# Patient Record
Sex: FEMALE | Race: WHITE | Employment: FULL TIME | URBAN - METROPOLITAN AREA
[De-identification: names, ages, dates, MRNs, and addresses within clinical notes are randomized per-mention and may not be internally consistent; named-entity substitution may affect disease eponyms.]

---

## 2018-11-08 ENCOUNTER — HOSPITAL ENCOUNTER (INPATIENT)
Age: 23
LOS: 4 days | Discharge: HOME | DRG: 639 | End: 2018-11-12
Attending: EMERGENCY MEDICINE | Admitting: INTERNAL MEDICINE
Payer: COMMERCIAL

## 2018-11-08 ENCOUNTER — APPOINTMENT (OUTPATIENT)
Dept: GENERAL RADIOLOGY | Age: 23
DRG: 639 | End: 2018-11-08
Attending: INTERNAL MEDICINE
Payer: COMMERCIAL

## 2018-11-08 DIAGNOSIS — E13.10 DIABETIC KETOACIDOSIS WITHOUT COMA ASSOCIATED WITH OTHER SPECIFIED DIABETES MELLITUS (HCC): Primary | ICD-10-CM

## 2018-11-08 PROBLEM — E11.10 DKA (DIABETIC KETOACIDOSES): Status: ACTIVE | Noted: 2018-11-08

## 2018-11-08 PROBLEM — N61.1 ABSCESS OF RIGHT BREAST: Status: ACTIVE | Noted: 2018-11-08

## 2018-11-08 LAB
ADMINISTERED INITIALS, ADMINIT: NORMAL
ALBUMIN SERPL-MCNC: 4.1 G/DL (ref 3.5–5)
ALBUMIN/GLOB SERPL: 1.1 {RATIO} (ref 1.2–3.5)
ALP SERPL-CCNC: 166 U/L (ref 50–136)
ALT SERPL-CCNC: 27 U/L (ref 12–65)
ANION GAP SERPL CALC-SCNC: 18 MMOL/L (ref 7–16)
ANION GAP SERPL CALC-SCNC: 21 MMOL/L (ref 7–16)
ANION GAP SERPL CALC-SCNC: 23 MMOL/L (ref 7–16)
AST SERPL-CCNC: 17 U/L (ref 15–37)
ATRIAL RATE: 138 BPM
BASOPHILS # BLD: 0 K/UL (ref 0–0.2)
BASOPHILS NFR BLD: 0 % (ref 0–2)
BILIRUB SERPL-MCNC: 0.4 MG/DL (ref 0.2–1.1)
BUN SERPL-MCNC: 12 MG/DL (ref 6–23)
BUN SERPL-MCNC: 6 MG/DL (ref 6–23)
BUN SERPL-MCNC: 9 MG/DL (ref 6–23)
CALCIUM SERPL-MCNC: 7 MG/DL (ref 8.3–10.4)
CALCIUM SERPL-MCNC: 7.5 MG/DL (ref 8.3–10.4)
CALCIUM SERPL-MCNC: 8.1 MG/DL (ref 8.3–10.4)
CALCULATED P AXIS, ECG09: 77 DEGREES
CALCULATED R AXIS, ECG10: 73 DEGREES
CALCULATED T AXIS, ECG11: 74 DEGREES
CHLORIDE SERPL-SCNC: 110 MMOL/L (ref 98–107)
CHLORIDE SERPL-SCNC: 113 MMOL/L (ref 98–107)
CHLORIDE SERPL-SCNC: 119 MMOL/L (ref 98–107)
CO2 SERPL-SCNC: 4 MMOL/L (ref 21–32)
CO2 SERPL-SCNC: 5 MMOL/L (ref 21–32)
CO2 SERPL-SCNC: 8 MMOL/L (ref 21–32)
CREAT SERPL-MCNC: 0.6 MG/DL (ref 0.6–1)
CREAT SERPL-MCNC: 0.62 MG/DL (ref 0.6–1)
CREAT SERPL-MCNC: 0.95 MG/DL (ref 0.6–1)
D50 ADMINISTERED, D50ADM: 0 ML
D50 ORDER, D50ORD: 0 ML
DIAGNOSIS, 93000: NORMAL
DIFFERENTIAL METHOD BLD: ABNORMAL
EOSINOPHIL # BLD: 0 K/UL (ref 0–0.8)
EOSINOPHIL NFR BLD: 0 % (ref 0.5–7.8)
ERYTHROCYTE [DISTWIDTH] IN BLOOD BY AUTOMATED COUNT: 13.2 %
EST. AVERAGE GLUCOSE BLD GHB EST-MCNC: 240 MG/DL
GLOBULIN SER CALC-MCNC: 3.9 G/DL (ref 2.3–3.5)
GLSCOM COMMENTS: NORMAL
GLUCOSE BLD STRIP.AUTO-MCNC: 124 MG/DL (ref 65–100)
GLUCOSE BLD STRIP.AUTO-MCNC: 128 MG/DL (ref 65–100)
GLUCOSE BLD STRIP.AUTO-MCNC: 159 MG/DL (ref 65–100)
GLUCOSE BLD STRIP.AUTO-MCNC: 192 MG/DL (ref 65–100)
GLUCOSE BLD STRIP.AUTO-MCNC: 258 MG/DL (ref 65–100)
GLUCOSE BLD STRIP.AUTO-MCNC: 345 MG/DL (ref 65–100)
GLUCOSE BLD STRIP.AUTO-MCNC: 413 MG/DL (ref 65–100)
GLUCOSE SERPL-MCNC: 111 MG/DL (ref 65–100)
GLUCOSE SERPL-MCNC: 185 MG/DL (ref 65–100)
GLUCOSE SERPL-MCNC: 425 MG/DL (ref 65–100)
GLUCOSE, GLC: 124 MG/DL
GLUCOSE, GLC: 128 MG/DL
GLUCOSE, GLC: 159 MG/DL
GLUCOSE, GLC: 192 MG/DL
GLUCOSE, GLC: 258 MG/DL
GLUCOSE, GLC: 345 MG/DL
HBA1C MFR BLD: 10 % (ref 4.8–6)
HCG UR QL: NEGATIVE
HCG UR QL: NEGATIVE
HCT VFR BLD AUTO: 44.3 % (ref 35.8–46.3)
HGB BLD-MCNC: 14.9 G/DL (ref 11.7–15.4)
HIGH TARGET, HITG: 250 MG/DL
IMM GRANULOCYTES # BLD: 0.1 K/UL (ref 0–0.5)
IMM GRANULOCYTES NFR BLD AUTO: 1 % (ref 0–5)
INSULIN ADMINSTERED, INSADM: 0 UNITS/HOUR
INSULIN ADMINSTERED, INSADM: 0.7 UNITS/HOUR
INSULIN ADMINSTERED, INSADM: 2 UNITS/HOUR
INSULIN ADMINSTERED, INSADM: 2.6 UNITS/HOUR
INSULIN ADMINSTERED, INSADM: 4 UNITS/HOUR
INSULIN ADMINSTERED, INSADM: 5.7 UNITS/HOUR
INSULIN ORDER, INSORD: 0 UNITS/HOUR
INSULIN ORDER, INSORD: 0.7 UNITS/HOUR
INSULIN ORDER, INSORD: 2 UNITS/HOUR
INSULIN ORDER, INSORD: 2.6 UNITS/HOUR
INSULIN ORDER, INSORD: 4 UNITS/HOUR
INSULIN ORDER, INSORD: 5.7 UNITS/HOUR
LOW TARGET, LOT: 150 MG/DL
LYMPHOCYTES # BLD: 1 K/UL (ref 0.5–4.6)
LYMPHOCYTES NFR BLD: 8 % (ref 13–44)
MAGNESIUM SERPL-MCNC: 1.5 MG/DL (ref 1.8–2.4)
MAGNESIUM SERPL-MCNC: 1.6 MG/DL (ref 1.8–2.4)
MAGNESIUM SERPL-MCNC: 2 MG/DL (ref 1.8–2.4)
MCH RBC QN AUTO: 34.5 PG (ref 26.1–32.9)
MCHC RBC AUTO-ENTMCNC: 33.6 G/DL (ref 31.4–35)
MCV RBC AUTO: 102.5 FL (ref 79.6–97.8)
MINUTES UNTIL NEXT BG, NBG: 60 MIN
MONOCYTES # BLD: 0.8 K/UL (ref 0.1–1.3)
MONOCYTES NFR BLD: 6 % (ref 4–12)
MULTIPLIER, MUL: 0
MULTIPLIER, MUL: 0.01
MULTIPLIER, MUL: 0.02
NEUTS SEG # BLD: 11 K/UL (ref 1.7–8.2)
NEUTS SEG NFR BLD: 85 % (ref 43–78)
NRBC # BLD: 0 K/UL (ref 0–0.2)
ORDER INITIALS, ORDINIT: NORMAL
P-R INTERVAL, ECG05: 186 MS
PHOSPHATE SERPL-MCNC: 1.4 MG/DL (ref 2.5–4.5)
PHOSPHATE SERPL-MCNC: 4.3 MG/DL (ref 2.5–4.5)
PLATELET # BLD AUTO: 165 K/UL (ref 150–450)
PMV BLD AUTO: 11.8 FL (ref 9.4–12.3)
POTASSIUM SERPL-SCNC: 4.3 MMOL/L (ref 3.5–5.1)
POTASSIUM SERPL-SCNC: 4.3 MMOL/L (ref 3.5–5.1)
POTASSIUM SERPL-SCNC: 5 MMOL/L (ref 3.5–5.1)
PROT SERPL-MCNC: 8 G/DL (ref 6.3–8.2)
Q-T INTERVAL, ECG07: 280 MS
QRS DURATION, ECG06: 74 MS
QTC CALCULATION (BEZET), ECG08: 424 MS
RBC # BLD AUTO: 4.32 M/UL (ref 4.05–5.2)
SODIUM SERPL-SCNC: 138 MMOL/L (ref 136–145)
SODIUM SERPL-SCNC: 139 MMOL/L (ref 136–145)
SODIUM SERPL-SCNC: 144 MMOL/L (ref 136–145)
VENTRICULAR RATE, ECG03: 138 BPM
WBC # BLD AUTO: 12.9 K/UL (ref 4.3–11.1)

## 2018-11-08 PROCEDURE — 77030020263 HC SOL INJ SOD CL0.9% LFCR 1000ML

## 2018-11-08 PROCEDURE — 74011250637 HC RX REV CODE- 250/637: Performed by: INTERNAL MEDICINE

## 2018-11-08 PROCEDURE — 81025 URINE PREGNANCY TEST: CPT

## 2018-11-08 PROCEDURE — 84100 ASSAY OF PHOSPHORUS: CPT

## 2018-11-08 PROCEDURE — 96361 HYDRATE IV INFUSION ADD-ON: CPT | Performed by: EMERGENCY MEDICINE

## 2018-11-08 PROCEDURE — 74011000250 HC RX REV CODE- 250: Performed by: INTERNAL MEDICINE

## 2018-11-08 PROCEDURE — 80053 COMPREHEN METABOLIC PANEL: CPT

## 2018-11-08 PROCEDURE — 81003 URINALYSIS AUTO W/O SCOPE: CPT | Performed by: EMERGENCY MEDICINE

## 2018-11-08 PROCEDURE — 71045 X-RAY EXAM CHEST 1 VIEW: CPT

## 2018-11-08 PROCEDURE — 83735 ASSAY OF MAGNESIUM: CPT

## 2018-11-08 PROCEDURE — 74011250636 HC RX REV CODE- 250/636: Performed by: EMERGENCY MEDICINE

## 2018-11-08 PROCEDURE — 74011250636 HC RX REV CODE- 250/636: Performed by: HOSPITALIST

## 2018-11-08 PROCEDURE — 74011636637 HC RX REV CODE- 636/637: Performed by: EMERGENCY MEDICINE

## 2018-11-08 PROCEDURE — 99285 EMERGENCY DEPT VISIT HI MDM: CPT | Performed by: EMERGENCY MEDICINE

## 2018-11-08 PROCEDURE — 82962 GLUCOSE BLOOD TEST: CPT

## 2018-11-08 PROCEDURE — 74011000258 HC RX REV CODE- 258: Performed by: EMERGENCY MEDICINE

## 2018-11-08 PROCEDURE — 74011250636 HC RX REV CODE- 250/636: Performed by: INTERNAL MEDICINE

## 2018-11-08 PROCEDURE — 82800 BLOOD PH: CPT

## 2018-11-08 PROCEDURE — 83036 HEMOGLOBIN GLYCOSYLATED A1C: CPT

## 2018-11-08 PROCEDURE — 96374 THER/PROPH/DIAG INJ IV PUSH: CPT | Performed by: EMERGENCY MEDICINE

## 2018-11-08 PROCEDURE — 85025 COMPLETE CBC W/AUTO DIFF WBC: CPT

## 2018-11-08 PROCEDURE — 82803 BLOOD GASES ANY COMBINATION: CPT

## 2018-11-08 PROCEDURE — 65610000001 HC ROOM ICU GENERAL

## 2018-11-08 PROCEDURE — 93005 ELECTROCARDIOGRAM TRACING: CPT | Performed by: EMERGENCY MEDICINE

## 2018-11-08 PROCEDURE — 80048 BASIC METABOLIC PNL TOTAL CA: CPT

## 2018-11-08 PROCEDURE — 77030019605

## 2018-11-08 PROCEDURE — 36415 COLL VENOUS BLD VENIPUNCTURE: CPT

## 2018-11-08 RX ORDER — DEXTROSE 40 %
15 GEL (GRAM) ORAL AS NEEDED
Status: DISCONTINUED | OUTPATIENT
Start: 2018-11-08 | End: 2018-11-12 | Stop reason: HOSPADM

## 2018-11-08 RX ORDER — SODIUM CHLORIDE 9 MG/ML
150 INJECTION, SOLUTION INTRAVENOUS CONTINUOUS
Status: DISCONTINUED | OUTPATIENT
Start: 2018-11-08 | End: 2018-11-09

## 2018-11-08 RX ORDER — ONDANSETRON 2 MG/ML
4 INJECTION INTRAMUSCULAR; INTRAVENOUS
Status: DISCONTINUED | OUTPATIENT
Start: 2018-11-08 | End: 2018-11-12 | Stop reason: HOSPADM

## 2018-11-08 RX ORDER — HEPARIN SODIUM 5000 [USP'U]/ML
5000 INJECTION, SOLUTION INTRAVENOUS; SUBCUTANEOUS EVERY 12 HOURS
Status: DISCONTINUED | OUTPATIENT
Start: 2018-11-08 | End: 2018-11-10

## 2018-11-08 RX ORDER — DEXTROSE 50 % IN WATER (D50W) INTRAVENOUS SYRINGE
25-50 AS NEEDED
Status: DISCONTINUED | OUTPATIENT
Start: 2018-11-08 | End: 2018-11-09

## 2018-11-08 RX ORDER — SODIUM CHLORIDE 9 MG/ML
2000 INJECTION, SOLUTION INTRAVENOUS ONCE
Status: COMPLETED | OUTPATIENT
Start: 2018-11-08 | End: 2018-11-09

## 2018-11-08 RX ORDER — CEPHALEXIN 500 MG/1
500 CAPSULE ORAL EVERY 6 HOURS
Status: DISCONTINUED | OUTPATIENT
Start: 2018-11-08 | End: 2018-11-12 | Stop reason: HOSPADM

## 2018-11-08 RX ORDER — POTASSIUM CHLORIDE AND SODIUM CHLORIDE 450; 150 MG/100ML; MG/100ML
INJECTION, SOLUTION INTRAVENOUS CONTINUOUS
Status: DISCONTINUED | OUTPATIENT
Start: 2018-11-08 | End: 2018-11-08

## 2018-11-08 RX ADMIN — SODIUM CHLORIDE 5.7 UNITS/HR: 9 INJECTION, SOLUTION INTRAVENOUS at 18:16

## 2018-11-08 RX ADMIN — SODIUM CHLORIDE 125 ML/HR: 900 INJECTION, SOLUTION INTRAVENOUS at 21:05

## 2018-11-08 RX ADMIN — SODIUM CHLORIDE 1000 ML: 900 INJECTION, SOLUTION INTRAVENOUS at 18:17

## 2018-11-08 RX ADMIN — Medication: at 20:01

## 2018-11-08 RX ADMIN — INSULIN HUMAN 10 UNITS: 100 INJECTION, SOLUTION PARENTERAL at 17:18

## 2018-11-08 RX ADMIN — SODIUM CHLORIDE 2000 ML: 900 INJECTION, SOLUTION INTRAVENOUS at 16:47

## 2018-11-08 RX ADMIN — HEPARIN SODIUM 5000 UNITS: 5000 INJECTION INTRAVENOUS; SUBCUTANEOUS at 19:41

## 2018-11-08 RX ADMIN — CEPHALEXIN 500 MG: 500 CAPSULE ORAL at 19:41

## 2018-11-08 NOTE — ED NOTES
TRANSFER - OUT REPORT: 
 
Verbal report given to Hany Horan RN(name) on [employer]  being transferred to 3103(unit) for routine progression of care Report consisted of patients Situation, Background, Assessment and  
Recommendations(SBAR). Information from the following report(s) SBAR, Kardex, ED Summary, [place] and Recent Results was reviewed with the receiving nurse. Lines:  
Peripheral IV 11/08/18 Right Antecubital (Active) Site Assessment Clean, dry, & intact 11/8/2018  4:26 PM  
Phlebitis Assessment 0 11/8/2018  4:26 PM  
Infiltration Assessment 0 11/8/2018  4:26 PM  
Dressing Status Clean, dry, & intact 11/8/2018  4:26 PM  
Hub Color/Line Status Pink 11/8/2018  4:26 PM  
   
Peripheral IV 11/08/18 Left Hand (Active) Site Assessment Clean, dry, & intact 11/8/2018  5:44 PM  
Phlebitis Assessment 0 11/8/2018  5:44 PM  
Infiltration Assessment 0 11/8/2018  5:44 PM  
Dressing Status Clean, dry, & intact 11/8/2018  5:44 PM  
Dressing Type Transparent 11/8/2018  5:44 PM  
  
 
Opportunity for questions and clarification was provided. Patient transported with: 
 Registered Nurse

## 2018-11-08 NOTE — H&P
Hospitalist H&P/Consult Note Admit Date:  2018  4:19 PM  
Name:  Dwaine Vance Age:  20 y.o. 
:  1995 MRN:  [id number] PCP:  Jessica Alvarenga MD 
Treatment Team: Primary Nurse: David Wilson RN 
 
HPI:  
20years old F with PMH of anxiety presented to the hospital complaining of nausea and vomiting started this morning. Patient stated having associated epigastric pain worse with vomiting. Patient reported feeling very thirsty and having polyuria for several weeks. Patient denies history of DM or family history of DM. Patient went to urgent care where she was found to have elevated blood sugar above 400. Patient reported had I&D of R breast abscess twice, she is on day 1 of keflex. Patient also denies cough, sick contacts at home, diarrhea, dysuria. In the ED labs revealed blood glucose above 400 with AG 23. Respiratory therapist reported a Vanous Ph of 6.88   
 
10 systems reviewed and negative except as noted in HPI. History reviewed. No pertinent past medical history. Past Surgical History:  
Procedure Laterality Date  HX LYMPHADENECTOMY Left  Prior to Admission Medications Prescriptions Last Dose Informant Patient Reported? Taking? LORazepam (ATIVAN) 0.5 mg tablet   No No  
Sig: Take 1 Tab by mouth every eight (8) hours as needed for Anxiety. Max Daily Amount: 1.5 mg.  
escitalopram oxalate (LEXAPRO) 20 mg tablet   No No  
Sig: Take 1 Tab by mouth daily. Facility-Administered Medications: None No Known Allergies Social History Tobacco Use  Smoking status: Never Smoker  Smokeless tobacco: Never Used Substance Use Topics  Alcohol use: Yes Comment: wine occasionally Family History Problem Relation Age of Onset  No Known Problems Mother  No Known Problems Father There is no immunization history on file for this patient. Objective:  
 
Patient Vitals for the past 24 hrs: 
 Temp Pulse BP SpO2 11/08/18 1843   116/56   
11/08/18 1721 97.8 °F (36.6 °C)     
11/08/18 1620  (!) 144 130/80 100 % Oxygen Therapy O2 Sat (%): 100 % (11/08/18 1620) Pulse via Oximetry: 144 beats per minute (11/08/18 1620) No intake or output data in the 24 hours ending 11/08/18 1855 Physical Exam: 
General:    Well nourished. Alert. Eyes:   Normal sclera. Extraocular movements intact. ENT:  Normocephalic, atraumatic. Moist mucous membranes CV:   RRR. No murmur, rub, or gallop. Lungs:  CTAB. No wheezing, rhonchi, or rales. Abdomen: Soft, nontender, nondistended. Bowel sounds normal.  
Extremities: Warm and dry. No cyanosis or edema. Neurologic: CN II-XII grossly intact. Sensation intact. Skin:     No rashes or jaundice. No wounds. Psych:  Normal mood and affect. Breast exam:   R breast with no tenderness, erythema or fluctuance. I reviewed the labs and other studies done this admission. Data Review:  
Recent Results (from the past 24 hour(s)) GLUCOSE, POC Collection Time: 11/08/18  4:27 PM  
Result Value Ref Range Glucose (POC) 413 (H) 65 - 100 mg/dL METABOLIC PANEL, COMPREHENSIVE Collection Time: 11/08/18  4:36 PM  
Result Value Ref Range Sodium 138 136 - 145 mmol/L Potassium 5.0 3.5 - 5.1 mmol/L Chloride 110 (H) 98 - 107 mmol/L  
 CO2 5 (LL) 21 - 32 mmol/L Anion gap 23 (H) 7 - 16 mmol/L Glucose 425 (H) 65 - 100 mg/dL BUN 12 6 - 23 MG/DL Creatinine 0.95 0.6 - 1.0 MG/DL  
 GFR est AA >60 >60 ml/min/1.73m2 GFR est non-AA >60 >60 ml/min/1.73m2 Calcium 8.1 (L) 8.3 - 10.4 MG/DL Bilirubin, total 0.4 0.2 - 1.1 MG/DL  
 ALT (SGPT) 27 12 - 65 U/L  
 AST (SGOT) 17 15 - 37 U/L Alk. phosphatase 166 (H) 50 - 136 U/L Protein, total 8.0 6.3 - 8.2 g/dL Albumin 4.1 3.5 - 5.0 g/dL Globulin 3.9 (H) 2.3 - 3.5 g/dL A-G Ratio 1.1 (L) 1.2 - 3.5 MAGNESIUM Collection Time: 11/08/18  4:36 PM  
Result Value Ref Range Magnesium 2.0 1.8 - 2.4 mg/dL PHOSPHORUS Collection Time: 11/08/18  4:36 PM  
Result Value Ref Range Phosphorus 4.3 2.5 - 4.5 MG/DL  
HCG URINE, QL. - POC Collection Time: 11/08/18  4:39 PM  
Result Value Ref Range Pregnancy test,urine (POC) NEGATIVE  NEG    
CBC WITH AUTOMATED DIFF Collection Time: 11/08/18  5:04 PM  
Result Value Ref Range WBC 12.9 (H) 4.3 - 11.1 K/uL  
 RBC 4.32 4.05 - 5.2 M/uL  
 HGB 14.9 11.7 - 15.4 g/dL HCT 44.3 35.8 - 46.3 % .5 (H) 79.6 - 97.8 FL  
 MCH 34.5 (H) 26.1 - 32.9 PG  
 MCHC 33.6 31.4 - 35.0 g/dL  
 RDW 13.2 % PLATELET 829 581 - 953 K/uL MPV 11.8 9.4 - 12.3 FL ABSOLUTE NRBC 0.00 0.0 - 0.2 K/uL NEUTROPHILS 85 (H) 43 - 78 % LYMPHOCYTES 8 (L) 13 - 44 % MONOCYTES 6 4.0 - 12.0 % EOSINOPHILS 0 (L) 0.5 - 7.8 % BASOPHILS 0 0.0 - 2.0 % IMMATURE GRANULOCYTES 1 0.0 - 5.0 %  
 ABS. NEUTROPHILS 11.0 (H) 1.7 - 8.2 K/UL  
 ABS. LYMPHOCYTES 1.0 0.5 - 4.6 K/UL  
 ABS. MONOCYTES 0.8 0.1 - 1.3 K/UL  
 ABS. EOSINOPHILS 0.0 0.0 - 0.8 K/UL  
 ABS. BASOPHILS 0.0 0.0 - 0.2 K/UL  
 ABS. IMM. GRANS. 0.1 0.0 - 0.5 K/UL  
 DF AUTOMATED    
HCG URINE, QL. - POC Collection Time: 11/08/18  5:11 PM  
Result Value Ref Range Pregnancy test,urine (POC) NEGATIVE  NEG    
GLUCOSE, POC Collection Time: 11/08/18  6:10 PM  
Result Value Ref Range Glucose (POC) 345 (H) 65 - 100 mg/dL Jony Plume Collection Time: 11/08/18  6:12 PM  
Result Value Ref Range Glucose 345 mg/dL Insulin order 5.7 units/hour Insulin adminstered 5.7 units/hour Multiplier 0.020 Low target 150 mg/dL High target 250 mg/dL D50 order 0.0 ml  
 D50 administered 0.00 ml Minutes until next BG 60 min Order initials sw Administered initials sw GLSCOM Comments Imaging Studies: CXR Results  (Last 48 hours) None CT Results  (Last 48 hours) None Assessment and Plan:  
 
Hospital Problems as of 11/8/2018 Date Reviewed: 10/11/2018 Codes Class Noted - Resolved POA * (Principal) DKA (diabetic ketoacidoses) ([facility] [address] ICD-10-CM: E13.10 ICD-9-CM: 250.10  11/8/2018 - Present Unknown Abscess of right breast ICD-10-CM: N61.1 ICD-9-CM: 611.0  11/8/2018 - Present Unknown  
   
  
 
-DKA New onset diabetes Patient with elevated AG, elevated blood glucose and low venous ph 
Mild elevation WBC Plan Start DKA protocol with glucose stabilizer Patient ph is 6.88 requiring Bicar per ADA protocol Check electrolytes per protocol and continue insulin drip IVFs with 0.45 +K+. Bicar infusion for 2 hours and reassess venous pH Diabetes educator consult on AM 
Check CXR and UA for any occult infection 
 
 
-R Breast abscess S/p I&D at breast clinic 
R breast does not look infected on physical exam 
Cont keflex PO 
 
 
DVT ppx: heparin Code status:Full Case discussed with patient and family at bedside. Estimated LOS:   
 
Signed: Kin Henderson MD

## 2018-11-08 NOTE — ED PROVIDER NOTES
77-year-old female presents with complaint of generalized weakness for the past several days. Patient was seen in urgent care informed that she was hyperglycemic. States that for the past 1-2 weeks she has had intermittent polyuria and polydipsia. States today should worsening generalized weakness and fatigue. Patient reports that she had nausea and vomiting. Patient denies any significant past medical history. States around 1-2 weeks ago she was diagnosed with abscess  To right breast.  States she had site I&D and was on antibiotics. Denies any drainage from site currently. Patient denies abdominal pain, dysuria, hematuria, chest pain, shortness of breath, headache, neck pain. The history is provided by the patient. No  was used. High Blood Sugar This is a new problem. The current episode started 12 to 24 hours ago. The problem occurs constantly. The problem has not changed since onset. The pain is at a severity of 0/10. The patient is experiencing no pain. Associated symptoms include nausea, vomiting and myalgias. Pertinent negatives include no anorexia, no fever, no belching, no diarrhea, no flatus, no hematochezia, no melena, no constipation, no dysuria, no frequency, no hematuria, no headaches, no arthralgias, no trauma, no chest pain and no back pain. Nothing worsens the pain. The pain is relieved by nothing. History reviewed. No pertinent past medical history. Past Surgical History:  
Procedure Laterality Date  HX LYMPHADENECTOMY Left 2003 Family History:  
Problem Relation Age of Onset  No Known Problems Mother  No Known Problems Father Social History Socioeconomic History  Marital status:  Spouse name: Not on file  Number of children: Not on file  Years of education: Not on file  Highest education level: Not on file Social Needs  Financial resource strain: Not on file  Food insecurity - worry: Not on file  Food insecurity - inability: Not on file  Transportation needs - medical: Not on file  Transportation needs - non-medical: Not on file Occupational History  Not on file Tobacco Use  Smoking status: Never Smoker  Smokeless tobacco: Never Used Substance and Sexual Activity  Alcohol use: Yes Comment: wine occasionally  Drug use: Not on file  Sexual activity: Not on file Other Topics Concern  Not on file Social History Narrative  Not on file ALLERGIES: Patient has no known allergies. Review of Systems Constitutional: Negative for fever. Cardiovascular: Negative for chest pain. Gastrointestinal: Positive for nausea and vomiting. Negative for anorexia, constipation, diarrhea, flatus, hematochezia and melena. Genitourinary: Negative for dysuria, frequency and hematuria. Musculoskeletal: Positive for myalgias. Negative for arthralgias and back pain. Neurological: Negative for headaches. Vitals:  
 11/08/18 1620 BP: 130/80 Pulse: (!) 144 SpO2: 100% Physical Exam  
Constitutional: She is oriented to person, place, and time. She appears well-developed. Patient well-appearing and in no acute distress. HENT:  
Head: Normocephalic. Dry mucous membranes. Eyes: Conjunctivae and EOM are normal. Pupils are equal, round, and reactive to light. Neck: Neck supple. No JVD present. Cardiovascular: Normal rate, regular rhythm and normal heart sounds. Tachycardic. Radial pulses 2+ and equal bilaterally. Pulmonary/Chest: Effort normal.  
CTAB. Abdominal: Soft. Bowel sounds are normal. She exhibits no distension. There is no tenderness. Soft, NTND. No CVAT. No rebound or guarding. Musculoskeletal: Normal range of motion. She exhibits no edema. No LE edema or calf TTP. Neurological: She is alert and oriented to person, place, and time. No cranial nerve deficit or sensory deficit. Strength 5/5 throughout. Normal sensory exam.  No facial droop. No dysarthria. Normal gait. Skin: Skin is warm and dry. No rash. Psychiatric: She has a normal mood and affect. Nursing note and vitals reviewed. MDM Number of Diagnoses or Management Options Diabetic ketoacidosis without coma associated with other specified diabetes mellitus [facility]): new and requires workup Diagnosis management comments: Glucose >400. AG 23. CO2 5. ABG w/ pH 6.88. Urine ketones >160. Patient given 2 L normal saline IV fluid bolus. Patient given insulin 10 units IV. Insulin drip ordered. K 5.0. Hospitalist consulted for admission to ICU. Patient & family updated on findings. Amount and/or Complexity of Data Reviewed Clinical lab tests: ordered and reviewed Tests in the radiology section of CPT®: ordered and reviewed Tests in the medicine section of CPT®: reviewed and ordered Review and summarize past medical records: yes Independent visualization of images, tracings, or specimens: yes Risk of Complications, Morbidity, and/or Mortality Presenting problems: moderate Diagnostic procedures: moderate Management options: moderate Patient Progress Patient progress: stable ED Course as of Nov 08 1753 Thu Nov 08, 2018  
1725 UPT negative. Urine ketones >160. [DF] ED Course User Index 
[DF] Alycia Huang MD  
 
 
EKG Date/Time: 11/8/2018 6:05 PM 
Performed by: Alycia Huang MD 
Authorized by: Alycia Huang MD  
 
ECG reviewed by ED Physician in the absence of a cardiologist: yes Rate:  
  ECG rate:  134 ECG rate assessment: tachycardic Rhythm:  
  Rhythm: sinus tachycardia Ectopy:  
  Ectopy: none QRS:  
  QRS axis:  Normal 
  QRS intervals:  Normal 
Conduction:  
  Conduction: normal   
ST segments: ST segments:  Normal 
T waves:  
  T waves: normal   
 
 
 
Results Include: 
 
Recent Results (from the past 24 hour(s)) GLUCOSE, POC Collection Time: 11/08/18  4:27 PM  
Result Value Ref Range Glucose (POC) 413 (H) 65 - 100 mg/dL METABOLIC PANEL, COMPREHENSIVE Collection Time: 11/08/18  4:36 PM  
Result Value Ref Range Sodium 138 136 - 145 mmol/L Potassium 5.0 3.5 - 5.1 mmol/L Chloride 110 (H) 98 - 107 mmol/L  
 CO2 5 (LL) 21 - 32 mmol/L Anion gap 23 (H) 7 - 16 mmol/L Glucose 425 (H) 65 - 100 mg/dL BUN 12 6 - 23 MG/DL Creatinine 0.95 0.6 - 1.0 MG/DL  
 GFR est AA >60 >60 ml/min/1.73m2 GFR est non-AA >60 >60 ml/min/1.73m2 Calcium 8.1 (L) 8.3 - 10.4 MG/DL Bilirubin, total 0.4 0.2 - 1.1 MG/DL  
 ALT (SGPT) 27 12 - 65 U/L  
 AST (SGOT) 17 15 - 37 U/L Alk. phosphatase 166 (H) 50 - 136 U/L Protein, total 8.0 6.3 - 8.2 g/dL Albumin 4.1 3.5 - 5.0 g/dL Globulin 3.9 (H) 2.3 - 3.5 g/dL A-G Ratio 1.1 (L) 1.2 - 3.5 MAGNESIUM Collection Time: 11/08/18  4:36 PM  
Result Value Ref Range Magnesium 2.0 1.8 - 2.4 mg/dL PHOSPHORUS Collection Time: 11/08/18  4:36 PM  
Result Value Ref Range Phosphorus 4.3 2.5 - 4.5 MG/DL  
HCG URINE, QL. - POC Collection Time: 11/08/18  4:39 PM  
Result Value Ref Range Pregnancy test,urine (POC) NEGATIVE  NEG    
CBC WITH AUTOMATED DIFF Collection Time: 11/08/18  5:04 PM  
Result Value Ref Range WBC 12.9 (H) 4.3 - 11.1 K/uL  
 RBC 4.32 4.05 - 5.2 M/uL  
 HGB 14.9 11.7 - 15.4 g/dL HCT 44.3 35.8 - 46.3 % .5 (H) 79.6 - 97.8 FL  
 MCH 34.5 (H) 26.1 - 32.9 PG  
 MCHC 33.6 31.4 - 35.0 g/dL  
 RDW 13.2 % PLATELET 434 425 - 134 K/uL MPV 11.8 9.4 - 12.3 FL ABSOLUTE NRBC 0.00 0.0 - 0.2 K/uL NEUTROPHILS 85 (H) 43 - 78 % LYMPHOCYTES 8 (L) 13 - 44 % MONOCYTES 6 4.0 - 12.0 % EOSINOPHILS 0 (L) 0.5 - 7.8 % BASOPHILS 0 0.0 - 2.0 % IMMATURE GRANULOCYTES 1 0.0 - 5.0 %  
 ABS. NEUTROPHILS 11.0 (H) 1.7 - 8.2 K/UL  
 ABS. LYMPHOCYTES 1.0 0.5 - 4.6 K/UL  
 ABS. MONOCYTES 0.8 0.1 - 1.3 K/UL ABS. EOSINOPHILS 0.0 0.0 - 0.8 K/UL  
 ABS. BASOPHILS 0.0 0.0 - 0.2 K/UL  
 ABS. IMM. GRANS. 0.1 0.0 - 0.5 K/UL  
 DF AUTOMATED    
HCG URINE, QL. - POC Collection Time: 11/08/18  5:11 PM  
Result Value Ref Range Pregnancy test,urine (POC) NEGATIVE  NEG Manolo Ohara MD; 11/8/2018 @5:19 PM Voice dictation software was used during the making of this note. This software is not perfect and grammatical and other typographical errors may be present.   This note has not been proofread for errors. 
===================================================================

## 2018-11-08 NOTE — ED TRIAGE NOTES
Coming from urgent care. Woke up weak, N/V.  BGL at urgent care 440. Tachy at 140 for EMS. Given 4mg zofran and 1L NS with EMS. C/o of being very cold.  for EMS. Pt states she has never been diagnosed with diabetes. States lately she has been urinating a lot more and has experienced dry mouth and been drinking a lot more.

## 2018-11-09 LAB
ADMINISTERED INITIALS, ADMINIT: NORMAL
ANION GAP SERPL CALC-SCNC: 11 MMOL/L (ref 7–16)
ANION GAP SERPL CALC-SCNC: 14 MMOL/L (ref 7–16)
ANION GAP SERPL CALC-SCNC: 14 MMOL/L (ref 7–16)
ANION GAP SERPL CALC-SCNC: 22 MMOL/L (ref 7–16)
ANION GAP SERPL CALC-SCNC: 23 MMOL/L (ref 7–16)
ARTERIAL PATENCY WRIST A: ABNORMAL
BASE DEFICIT BLDV-SCNC: 23 MMOL/L
BASOPHILS # BLD: 0 K/UL (ref 0–0.2)
BASOPHILS NFR BLD: 0 % (ref 0–2)
BDY SITE: ABNORMAL
BODY TEMPERATURE: 98.6
BUN SERPL-MCNC: 3 MG/DL (ref 6–23)
BUN SERPL-MCNC: 3 MG/DL (ref 6–23)
BUN SERPL-MCNC: 4 MG/DL (ref 6–23)
BUN SERPL-MCNC: 4 MG/DL (ref 6–23)
BUN SERPL-MCNC: 6 MG/DL (ref 6–23)
CALCIUM SERPL-MCNC: 7.5 MG/DL (ref 8.3–10.4)
CALCIUM SERPL-MCNC: 7.6 MG/DL (ref 8.3–10.4)
CALCIUM SERPL-MCNC: 7.7 MG/DL (ref 8.3–10.4)
CALCIUM SERPL-MCNC: 7.8 MG/DL (ref 8.3–10.4)
CALCIUM SERPL-MCNC: 8 MG/DL (ref 8.3–10.4)
CHLORIDE SERPL-SCNC: 109 MMOL/L (ref 98–107)
CHLORIDE SERPL-SCNC: 110 MMOL/L (ref 98–107)
CHLORIDE SERPL-SCNC: 113 MMOL/L (ref 98–107)
CHLORIDE SERPL-SCNC: 114 MMOL/L (ref 98–107)
CHLORIDE SERPL-SCNC: 114 MMOL/L (ref 98–107)
CO2 BLD-SCNC: 5 MMOL/L
CO2 SERPL-SCNC: 13 MMOL/L (ref 21–32)
CO2 SERPL-SCNC: 13 MMOL/L (ref 21–32)
CO2 SERPL-SCNC: 16 MMOL/L (ref 21–32)
CO2 SERPL-SCNC: 5 MMOL/L (ref 21–32)
CO2 SERPL-SCNC: 6 MMOL/L (ref 21–32)
CREAT SERPL-MCNC: 0.57 MG/DL (ref 0.6–1)
CREAT SERPL-MCNC: 0.63 MG/DL (ref 0.6–1)
CREAT SERPL-MCNC: 0.65 MG/DL (ref 0.6–1)
CREAT SERPL-MCNC: 0.7 MG/DL (ref 0.6–1)
CREAT SERPL-MCNC: 0.71 MG/DL (ref 0.6–1)
D50 ADMINISTERED, D50ADM: 0 ML
D50 ADMINISTERED, D50ADM: 14 ML
D50 ORDER, D50ORD: 0 ML
D50 ORDER, D50ORD: 14 ML
DIFFERENTIAL METHOD BLD: ABNORMAL
EOSINOPHIL # BLD: 0 K/UL (ref 0–0.8)
EOSINOPHIL NFR BLD: 0 % (ref 0.5–7.8)
ERYTHROCYTE [DISTWIDTH] IN BLOOD BY AUTOMATED COUNT: 13.3 %
GAS FLOW.O2 O2 DELIVERY SYS: ABNORMAL L/MIN
GLSCOM COMMENTS: NORMAL
GLUCOSE BLD STRIP.AUTO-MCNC: 111 MG/DL (ref 65–100)
GLUCOSE BLD STRIP.AUTO-MCNC: 125 MG/DL (ref 65–100)
GLUCOSE BLD STRIP.AUTO-MCNC: 130 MG/DL (ref 65–100)
GLUCOSE BLD STRIP.AUTO-MCNC: 130 MG/DL (ref 65–100)
GLUCOSE BLD STRIP.AUTO-MCNC: 141 MG/DL (ref 65–100)
GLUCOSE BLD STRIP.AUTO-MCNC: 152 MG/DL (ref 65–100)
GLUCOSE BLD STRIP.AUTO-MCNC: 160 MG/DL (ref 65–100)
GLUCOSE BLD STRIP.AUTO-MCNC: 168 MG/DL (ref 65–100)
GLUCOSE BLD STRIP.AUTO-MCNC: 177 MG/DL (ref 65–100)
GLUCOSE BLD STRIP.AUTO-MCNC: 177 MG/DL (ref 65–100)
GLUCOSE BLD STRIP.AUTO-MCNC: 204 MG/DL (ref 65–100)
GLUCOSE BLD STRIP.AUTO-MCNC: 207 MG/DL (ref 65–100)
GLUCOSE BLD STRIP.AUTO-MCNC: 217 MG/DL (ref 65–100)
GLUCOSE BLD STRIP.AUTO-MCNC: 221 MG/DL (ref 65–100)
GLUCOSE BLD STRIP.AUTO-MCNC: 228 MG/DL (ref 65–100)
GLUCOSE BLD STRIP.AUTO-MCNC: 231 MG/DL (ref 65–100)
GLUCOSE BLD STRIP.AUTO-MCNC: 238 MG/DL (ref 65–100)
GLUCOSE BLD STRIP.AUTO-MCNC: 66 MG/DL (ref 65–100)
GLUCOSE BLD STRIP.AUTO-MCNC: 78 MG/DL (ref 65–100)
GLUCOSE SERPL-MCNC: 145 MG/DL (ref 65–100)
GLUCOSE SERPL-MCNC: 169 MG/DL (ref 65–100)
GLUCOSE SERPL-MCNC: 176 MG/DL (ref 65–100)
GLUCOSE SERPL-MCNC: 209 MG/DL (ref 65–100)
GLUCOSE SERPL-MCNC: 92 MG/DL (ref 65–100)
GLUCOSE, GLC: 111 MG/DL
GLUCOSE, GLC: 125 MG/DL
GLUCOSE, GLC: 125 MG/DL
GLUCOSE, GLC: 130 MG/DL
GLUCOSE, GLC: 130 MG/DL
GLUCOSE, GLC: 141 MG/DL
GLUCOSE, GLC: 152 MG/DL
GLUCOSE, GLC: 160 MG/DL
GLUCOSE, GLC: 168 MG/DL
GLUCOSE, GLC: 177 MG/DL
GLUCOSE, GLC: 177 MG/DL
GLUCOSE, GLC: 204 MG/DL
GLUCOSE, GLC: 207 MG/DL
GLUCOSE, GLC: 217 MG/DL
GLUCOSE, GLC: 221 MG/DL
GLUCOSE, GLC: 228 MG/DL
GLUCOSE, GLC: 228 MG/DL
GLUCOSE, GLC: 231 MG/DL
GLUCOSE, GLC: 238 MG/DL
GLUCOSE, GLC: 66 MG/DL
GLUCOSE, GLC: 78 MG/DL
HCO3 BLDV-SCNC: 4.8 MMOL/L (ref 23–28)
HCT VFR BLD AUTO: 36 % (ref 35.8–46.3)
HGB BLD-MCNC: 12.6 G/DL (ref 11.7–15.4)
HIGH TARGET, HITG: 180 MG/DL
HIGH TARGET, HITG: 250 MG/DL
IMM GRANULOCYTES # BLD: 0 K/UL (ref 0–0.5)
IMM GRANULOCYTES NFR BLD AUTO: 0 % (ref 0–5)
INSULIN ADMINSTERED, INSADM: 0 UNITS/HOUR
INSULIN ADMINSTERED, INSADM: 0.7 UNITS/HOUR
INSULIN ADMINSTERED, INSADM: 1 UNITS/HOUR
INSULIN ADMINSTERED, INSADM: 1 UNITS/HOUR
INSULIN ADMINSTERED, INSADM: 1.1 UNITS/HOUR
INSULIN ADMINSTERED, INSADM: 1.6 UNITS/HOUR
INSULIN ADMINSTERED, INSADM: 2.3 UNITS/HOUR
INSULIN ADMINSTERED, INSADM: 3.4 UNITS/HOUR
INSULIN ADMINSTERED, INSADM: 3.6 UNITS/HOUR
INSULIN ADMINSTERED, INSADM: 4.4 UNITS/HOUR
INSULIN ORDER, INSORD: 0 UNITS/HOUR
INSULIN ORDER, INSORD: 0.7 UNITS/HOUR
INSULIN ORDER, INSORD: 1 UNITS/HOUR
INSULIN ORDER, INSORD: 1 UNITS/HOUR
INSULIN ORDER, INSORD: 1.1 UNITS/HOUR
INSULIN ORDER, INSORD: 1.6 UNITS/HOUR
INSULIN ORDER, INSORD: 2.3 UNITS/HOUR
INSULIN ORDER, INSORD: 3.4 UNITS/HOUR
INSULIN ORDER, INSORD: 3.6 UNITS/HOUR
INSULIN ORDER, INSORD: 4.4 UNITS/HOUR
LACTATE SERPL-SCNC: 0.6 MMOL/L (ref 0.4–2)
LOW TARGET, LOT: 140 MG/DL
LOW TARGET, LOT: 150 MG/DL
LYMPHOCYTES # BLD: 0.9 K/UL (ref 0.5–4.6)
LYMPHOCYTES NFR BLD: 13 % (ref 13–44)
MAGNESIUM SERPL-MCNC: 1.5 MG/DL (ref 1.8–2.4)
MAGNESIUM SERPL-MCNC: 1.6 MG/DL (ref 1.8–2.4)
MAGNESIUM SERPL-MCNC: 1.6 MG/DL (ref 1.8–2.4)
MAGNESIUM SERPL-MCNC: 1.7 MG/DL (ref 1.8–2.4)
MAGNESIUM SERPL-MCNC: 1.8 MG/DL (ref 1.8–2.4)
MCH RBC QN AUTO: 34.6 PG (ref 26.1–32.9)
MCHC RBC AUTO-ENTMCNC: 35 G/DL (ref 31.4–35)
MCV RBC AUTO: 98.9 FL (ref 79.6–97.8)
MINUTES UNTIL NEXT BG, NBG: 120 MIN
MINUTES UNTIL NEXT BG, NBG: 120 MIN
MINUTES UNTIL NEXT BG, NBG: 15 MIN
MINUTES UNTIL NEXT BG, NBG: 60 MIN
MONOCYTES # BLD: 0.5 K/UL (ref 0.1–1.3)
MONOCYTES NFR BLD: 8 % (ref 4–12)
MULTIPLIER, MUL: 0
MULTIPLIER, MUL: 0.01
MULTIPLIER, MUL: 0.02
MULTIPLIER, MUL: 0.03
MULTIPLIER, MUL: 0.03
NEUTS SEG # BLD: 5.1 K/UL (ref 1.7–8.2)
NEUTS SEG NFR BLD: 78 % (ref 43–78)
NRBC # BLD: 0 K/UL (ref 0–0.2)
ORDER INITIALS, ORDINIT: NORMAL
PCO2 BLDV: 15.7 MMHG (ref 41–51)
PH BLDV: 7.09 [PH] (ref 7.32–7.42)
PHOSPHATE SERPL-MCNC: 1.7 MG/DL (ref 2.5–4.5)
PLATELET # BLD AUTO: 146 K/UL (ref 150–450)
PMV BLD AUTO: 10.7 FL (ref 9.4–12.3)
PO2 BLDV: 25 MMHG
POTASSIUM SERPL-SCNC: 2.8 MMOL/L (ref 3.5–5.1)
POTASSIUM SERPL-SCNC: 3.1 MMOL/L (ref 3.5–5.1)
POTASSIUM SERPL-SCNC: 3.1 MMOL/L (ref 3.5–5.1)
POTASSIUM SERPL-SCNC: 3.6 MMOL/L (ref 3.5–5.1)
POTASSIUM SERPL-SCNC: 3.7 MMOL/L (ref 3.5–5.1)
RBC # BLD AUTO: 3.64 M/UL (ref 4.05–5.2)
SAO2 % BLDV: 29 % (ref 65–88)
SERVICE CMNT-IMP: ABNORMAL
SERVICE CMNT-IMP: ABNORMAL
SODIUM SERPL-SCNC: 137 MMOL/L (ref 136–145)
SODIUM SERPL-SCNC: 138 MMOL/L (ref 136–145)
SODIUM SERPL-SCNC: 140 MMOL/L (ref 136–145)
SODIUM SERPL-SCNC: 141 MMOL/L (ref 136–145)
SODIUM SERPL-SCNC: 141 MMOL/L (ref 136–145)
SPECIMEN TYPE: ABNORMAL
WBC # BLD AUTO: 6.5 K/UL (ref 4.3–11.1)

## 2018-11-09 PROCEDURE — 74011250637 HC RX REV CODE- 250/637: Performed by: INTERNAL MEDICINE

## 2018-11-09 PROCEDURE — 74011000250 HC RX REV CODE- 250: Performed by: INTERNAL MEDICINE

## 2018-11-09 PROCEDURE — 74011250636 HC RX REV CODE- 250/636: Performed by: INTERNAL MEDICINE

## 2018-11-09 PROCEDURE — 83605 ASSAY OF LACTIC ACID: CPT

## 2018-11-09 PROCEDURE — 80048 BASIC METABOLIC PNL TOTAL CA: CPT

## 2018-11-09 PROCEDURE — 84100 ASSAY OF PHOSPHORUS: CPT

## 2018-11-09 PROCEDURE — 77030020245 HC SOL INJ 5% D/0.9%NACL

## 2018-11-09 PROCEDURE — 74011636637 HC RX REV CODE- 636/637: Performed by: INTERNAL MEDICINE

## 2018-11-09 PROCEDURE — 83735 ASSAY OF MAGNESIUM: CPT

## 2018-11-09 PROCEDURE — 82803 BLOOD GASES ANY COMBINATION: CPT

## 2018-11-09 PROCEDURE — 74011000258 HC RX REV CODE- 258: Performed by: INTERNAL MEDICINE

## 2018-11-09 PROCEDURE — 36415 COLL VENOUS BLD VENIPUNCTURE: CPT

## 2018-11-09 PROCEDURE — 85025 COMPLETE CBC W/AUTO DIFF WBC: CPT

## 2018-11-09 PROCEDURE — 77030020263 HC SOL INJ SOD CL0.9% LFCR 1000ML

## 2018-11-09 PROCEDURE — 74011000250 HC RX REV CODE- 250: Performed by: HOSPITALIST

## 2018-11-09 PROCEDURE — 82962 GLUCOSE BLOOD TEST: CPT

## 2018-11-09 PROCEDURE — 65610000001 HC ROOM ICU GENERAL

## 2018-11-09 PROCEDURE — 74011250636 HC RX REV CODE- 250/636: Performed by: HOSPITALIST

## 2018-11-09 RX ORDER — POTASSIUM CHLORIDE 20 MEQ/1
40 TABLET, EXTENDED RELEASE ORAL EVERY 4 HOURS
Status: COMPLETED | OUTPATIENT
Start: 2018-11-09 | End: 2018-11-09

## 2018-11-09 RX ORDER — DEXTROSE MONOHYDRATE AND SODIUM CHLORIDE 5; .9 G/100ML; G/100ML
125 INJECTION, SOLUTION INTRAVENOUS CONTINUOUS
Status: DISCONTINUED | OUTPATIENT
Start: 2018-11-09 | End: 2018-11-09

## 2018-11-09 RX ORDER — POTASSIUM CHLORIDE AND SODIUM CHLORIDE 450; 150 MG/100ML; MG/100ML
INJECTION, SOLUTION INTRAVENOUS CONTINUOUS
Status: DISCONTINUED | OUTPATIENT
Start: 2018-11-09 | End: 2018-11-10

## 2018-11-09 RX ORDER — INSULIN LISPRO 100 [IU]/ML
5 INJECTION, SOLUTION INTRAVENOUS; SUBCUTANEOUS
Status: DISCONTINUED | OUTPATIENT
Start: 2018-11-09 | End: 2018-11-10

## 2018-11-09 RX ORDER — LANOLIN ALCOHOL/MO/W.PET/CERES
400 CREAM (GRAM) TOPICAL 2 TIMES DAILY
Status: DISCONTINUED | OUTPATIENT
Start: 2018-11-09 | End: 2018-11-12 | Stop reason: HOSPADM

## 2018-11-09 RX ORDER — DEXTROSE, SODIUM CHLORIDE, AND POTASSIUM CHLORIDE 5; .9; .15 G/100ML; G/100ML; G/100ML
100 INJECTION INTRAVENOUS CONTINUOUS
Status: DISCONTINUED | OUTPATIENT
Start: 2018-11-09 | End: 2018-11-09

## 2018-11-09 RX ORDER — INSULIN GLARGINE 100 [IU]/ML
8 INJECTION, SOLUTION SUBCUTANEOUS
Status: DISCONTINUED | OUTPATIENT
Start: 2018-11-09 | End: 2018-11-10

## 2018-11-09 RX ORDER — POTASSIUM CHLORIDE, DEXTROSE MONOHYDRATE AND SODIUM CHLORIDE 300; 5; 900 MG/100ML; G/100ML; MG/100ML
INJECTION, SOLUTION INTRAVENOUS CONTINUOUS
Status: DISCONTINUED | OUTPATIENT
Start: 2018-11-09 | End: 2018-11-09

## 2018-11-09 RX ORDER — SODIUM BICARBONATE 1 MEQ/ML
50 SYRINGE (ML) INTRAVENOUS ONCE
Status: COMPLETED | OUTPATIENT
Start: 2018-11-09 | End: 2018-11-09

## 2018-11-09 RX ADMIN — SODIUM CHLORIDE: 900 INJECTION, SOLUTION INTRAVENOUS at 11:48

## 2018-11-09 RX ADMIN — INSULIN LISPRO 5 UNITS: 100 INJECTION, SOLUTION INTRAVENOUS; SUBCUTANEOUS at 11:45

## 2018-11-09 RX ADMIN — CEPHALEXIN 500 MG: 500 CAPSULE ORAL at 18:53

## 2018-11-09 RX ADMIN — CEPHALEXIN 500 MG: 500 CAPSULE ORAL at 00:31

## 2018-11-09 RX ADMIN — HEPARIN SODIUM 5000 UNITS: 5000 INJECTION INTRAVENOUS; SUBCUTANEOUS at 08:34

## 2018-11-09 RX ADMIN — INSULIN GLARGINE 8 UNITS: 100 INJECTION, SOLUTION SUBCUTANEOUS at 21:24

## 2018-11-09 RX ADMIN — POTASSIUM CHLORIDE 40 MEQ: 20 TABLET, EXTENDED RELEASE ORAL at 15:48

## 2018-11-09 RX ADMIN — CEPHALEXIN 500 MG: 500 CAPSULE ORAL at 05:55

## 2018-11-09 RX ADMIN — ONDANSETRON HYDROCHLORIDE 4 MG: 2 INJECTION, SOLUTION INTRAVENOUS at 22:07

## 2018-11-09 RX ADMIN — ONDANSETRON HYDROCHLORIDE 4 MG: 2 INJECTION, SOLUTION INTRAVENOUS at 00:47

## 2018-11-09 RX ADMIN — Medication 400 MG: at 18:52

## 2018-11-09 RX ADMIN — SODIUM CHLORIDE 125 ML/HR: 900 INJECTION, SOLUTION INTRAVENOUS at 05:57

## 2018-11-09 RX ADMIN — CHLORASEPTIC 1 SPRAY: 1.5 LIQUID ORAL at 13:21

## 2018-11-09 RX ADMIN — SODIUM CHLORIDE: 900 INJECTION, SOLUTION INTRAVENOUS at 16:43

## 2018-11-09 RX ADMIN — POTASSIUM CHLORIDE 40 MEQ: 20 TABLET, EXTENDED RELEASE ORAL at 19:39

## 2018-11-09 RX ADMIN — POTASSIUM CHLORIDE 40 MEQ: 20 TABLET, EXTENDED RELEASE ORAL at 11:44

## 2018-11-09 RX ADMIN — HEPARIN SODIUM 5000 UNITS: 5000 INJECTION INTRAVENOUS; SUBCUTANEOUS at 19:39

## 2018-11-09 RX ADMIN — Medication 50 MEQ: at 03:01

## 2018-11-09 RX ADMIN — SODIUM CHLORIDE AND POTASSIUM CHLORIDE: 4.5; 1.49 INJECTION, SOLUTION INTRAVENOUS at 21:29

## 2018-11-09 RX ADMIN — Medication 400 MG: at 11:44

## 2018-11-09 RX ADMIN — DEXTROSE MONOHYDRATE AND SODIUM CHLORIDE 125 ML/HR: 5; .9 INJECTION, SOLUTION INTRAVENOUS at 19:51

## 2018-11-09 RX ADMIN — CEPHALEXIN 500 MG: 500 CAPSULE ORAL at 11:44

## 2018-11-09 RX ADMIN — DEXTROSE MONOHYDRATE AND SODIUM CHLORIDE 125 ML/HR: 5; .9 INJECTION, SOLUTION INTRAVENOUS at 12:12

## 2018-11-09 RX ADMIN — DEXTROSE MONOHYDRATE 12.5 G: 25 INJECTION, SOLUTION INTRAVENOUS at 12:00

## 2018-11-09 RX ADMIN — ONDANSETRON HYDROCHLORIDE 4 MG: 2 INJECTION, SOLUTION INTRAVENOUS at 15:48

## 2018-11-09 RX ADMIN — INSULIN LISPRO 5 UNITS: 100 INJECTION, SOLUTION INTRAVENOUS; SUBCUTANEOUS at 18:53

## 2018-11-09 NOTE — PROGRESS NOTES
TRANSFER - IN REPORT: 
 
Verbal report received from Chapin Rosario RN(name) on [employer]  being received from ED (unit) for routine progression of care Report consisted of patients Situation, Background, Assessment and  
Recommendations(SBAR). Information from the following report(s) SBAR, Kardex, ED Summary, Recent Results and Cardiac Rhythm Sinus Tach 140-150 was reviewed with the receiving nurse. Opportunity for questions and clarification was provided. Report handed off to [address] [place] [address], as she will be taking care of patient d/t being dropped off at shift change.

## 2018-11-09 NOTE — PROGRESS NOTES
Hospitalist Progress Note   
2018 Admit Date: 2018  4:19 PM  
NAME: David Christy :  1995 MRN:  [id number] Attending: Jerel St MD 
PCP:  Henry Samuels MD 
 
SUBJECTIVE:  
22yo female with h/o anxiety who presented in DKA, new onset DM1. Started on insulin gtt overnight. Unfortunately this was discontinued around midnight as her glucose had improved, but her AG had not closed. Insulin gtt now resumed. Pt reports she is feeling well. Wanting to eat. Denies CP, SOB, swelling. Review of Systems negative with exception of pertinent positives noted above PHYSICAL EXAM  
 
Visit Vitals /70 Pulse (!) 108 Temp 99.2 °F (37.3 °C) Resp 23 Ht 5' 2\" (1.575 m) Wt 40.9 kg (90 lb 2.7 oz) SpO2 100% BMI 16.49 kg/m² Temp (24hrs), Av.3 °F (36.8 °C), Min:96 °F (35.6 °C), Max:99.2 °F (37.3 °C) Oxygen Therapy O2 Sat (%): 100 % (18 08) Pulse via Oximetry: 108 beats per minute (18 0841) O2 Device: Room air (18 0730) Intake/Output Summary (Last 24 hours) at 2018 1029 Last data filed at 2018 7318 Gross per 24 hour Intake 1155.73 ml Output 4300 ml Net -3144.27 ml General: No acute distress   
Neck:  No LAD Lungs:  CTA Bilaterally Heart:  Regular rate and rhythm,  No murmur, rub, or gallop Abdomen: Soft, Non distended, Non tender, Positive bowel sounds Extremities: No cyanosis, clubbing or edema Neurologic:  No focal deficits Psych:  Calm, cooperative ASSESSMENT Active Hospital Problems Diagnosis Date Noted  DKA (diabetic ketoacidoses) ([facility] [address] 2018  Abscess of right breast 2018 DKA Newly diagnosed DM1 A1c 10. Insulin gtt discontinued around midnight in error, as gap had not closed. AG22, bicarb 5 on most recent BMP. - insulin gtt resumed 
- cont protocol 
- extensive discussion with nursing, pharmacy about issues with erroneous discontinuation of insulin gtt - follow BMP q4 
- change to D5 once glucose <250 
- discussed with endo - ok to eat but would cover her with Humalog 5 units AC in order to not affect insulin gtt settings 
- has met with diabetic educator - plan for outpt f/u with endo in 1 week - plan for Lantus 8 units (weight based calculation) once able to transition Electrolyte abnormalities 
- replace 
- follow Breast abscess 
- cont Keflex Proph - heparin SQ Full code Dispo - pending clinical improvement Signed By: Mitchell Ganser, MD   
 November 9, 2018

## 2018-11-09 NOTE — PROGRESS NOTES
Pt status and plan of care discussed w/Dr. Laura Tomlin, Hospitalist.  Insulin gtt resumed per glucostablizer with adjustment in BG goal range to 140-180. IVF NS increased to 150 ml/hr. Endocrinology consult ordered. Next BMP to be drawn now. Continuing to monitor pt closely.

## 2018-11-09 NOTE — PROGRESS NOTES
Patient admitted to ICU from ED. SpO2 100% on room air and  on monitor. Patient denies chest pain. /65, RR 22, cardiac rhythm is NSR on monitor. Blood glucose 258, and insulin gtt infusing. Patient is alert and oriented x 4, PERRL, strength equal in all extremities, and pulses palpable in all extremities. Skin is warm, dry, and intact. No pressure injury noted. Dual skin assessment performed with Jose Nix RN.

## 2018-11-09 NOTE — PROGRESS NOTES
Problem: Patient Education: Go to Patient Education Activity Goal: Patient/Family Education Nutrition: 
Nutrition Consult for diet education. (Dr. Jean Mosher) Assessment: Anthropometrics: Ht - 5'2\", wgt - 40.9 kg (ICU bed 11/9/18), BMI 16.5 c/w moderate thinness, edema - none. Macronutrient Needs Assessment: 
Estimated calorie needs - 3365-0992 katalina/day (25-30 katalina/kg/day) (82% IBW) Estimated protein needs - 41-49 gm pro/day (1-1.2 gm pro/kgIBW/day) (GFR >60 ml/min) CHO/day - 155 gm CHO/day or 10 CHO choices/day 3-3-3-1 pattern (50% katalina/day) Fluid/day - 1-1.3 liters/day (1 ml/katalina/day) Intake/Comparative Standards: 
 No data available. Pertinent Labs: 
 Hemoglobin A1c 10 (11/8/18); magnesium 1.6, potassium 2.8, phosphorus 1.7 (11/9/18). Pertinent Medications: 
            Insulin drip. Diet: 
 CCHO. Food/Nutrition History: 
 20year old lady admitted in DKA and new onset type 1 diabetes. She reports a recent 10 pound unintentional weight loss over the last 2 months. Diagnosis: 
Food and nutrition related knowledge deficit related to lack of prior exposure to information as evidenced by new diagnosis of diabetes. Intervention: 
Meals and Snacks: CCHO. Nutrition Education: The patient was instructed on a CCHO diet - 3 CHO/meal, 1 CHO/HS snack. The patient's mother was present for the session. The patient was given the following educational material: Quick Starts Diabetes Diet, Healthy Ways to [place] Your Diet, Type 1 Diabetes, My Plate Planner and Ready, Set, Start Counting! The patient was encouraged to attend an outpatient diabetes class. Nutrition Discharge Plan: Too soon to determine. Raina Liang. Marya Melissa 
[phone]

## 2018-11-09 NOTE — PROGRESS NOTES
Bedside and Verbal shift change report given to Brandyn Brannon RN (oncoming nurse) by Adalgisa Alcantara RN (offgoing nurse). Report included the following information SBAR, Kardex, ED Summary, Intake/Output, MAR, Recent Results and Cardiac Rhythm ST, HR 120s. Dual skin assessment reviewed. Pt resting quietly in bed, in NAD. A&Ox4. ST, HR 110s. BP 90-100s/60s. Denies any complaints at this time. IVF NS @ 125 ml/hr Insulin gtt currently off per glucostabliizer. Last venous ABG and BMP results noted. AG 22. CO2 6. Insulin gtt protocol orders reviewed per [facility].   Will discuss w/MD this am.

## 2018-11-09 NOTE — PROGRESS NOTES
Reviewed notes for spiritual concerns Jyotsna Lovett, staff [address], [id number] [facility] Jameel  /   [email or web address]

## 2018-11-09 NOTE — DIABETES MGMT
Attempted to see patient for follow up for questions regarding diabetes management. Pt currently asleep, mom at bedside. Did not disturb pt. Will continue to follow along and provide diabetes management education as needed.

## 2018-11-09 NOTE — PROGRESS NOTES
LEAPFROG PROTOCOL NOTE Millie Pizza 11/8/2018 The patient is currently in the critical care setting managed by Dr. John Garay with DKA. The patient's chart is reviewed and the patient is discussed with the staff. Patient is currently hemodynamically stable. Patient has no needs identified for Intensivist management in the critical care setting at this time. Please notify us if can be of assistance. No charge billed to the patient. Thank you.  
 
Jaiden Valdez MD

## 2018-11-09 NOTE — PROGRESS NOTES
Chart reviewed and pt discussed with primary RN, Trevor Caba. No needs noted at present for d/c. Pt insured by [employer], she works as  for 3rd grade and has PCP. Good family support. New dx of diabetes. DM following pt for assist and education. CM available if needed. Care Management Interventions Transition of Care Consult (CM Consult): Discharge Planning Discharge Location Discharge Placement: Home

## 2018-11-09 NOTE — INTERDISCIPLINARY ROUNDS
Interdisciplinary team rounds were held 11/9/2018 with the following team members:Care Management, Nursing, Nurse Practitioner, Nutrition, Pastoral Care, Pharmacy, Physical Therapy, Physician, Respiratory Therapy and Clinical Coordinator and the patient and spouse. Plan of care discussed. See clinical pathway and/or care plan for interventions and desired outcomes.

## 2018-11-10 LAB
ANION GAP SERPL CALC-SCNC: 11 MMOL/L (ref 7–16)
ANION GAP SERPL CALC-SCNC: 14 MMOL/L (ref 7–16)
BASOPHILS # BLD: 0 K/UL (ref 0–0.2)
BASOPHILS NFR BLD: 0 % (ref 0–2)
BUN SERPL-MCNC: 2 MG/DL (ref 6–23)
BUN SERPL-MCNC: 2 MG/DL (ref 6–23)
CALCIUM SERPL-MCNC: 8.3 MG/DL (ref 8.3–10.4)
CALCIUM SERPL-MCNC: 8.5 MG/DL (ref 8.3–10.4)
CHLORIDE SERPL-SCNC: 104 MMOL/L (ref 98–107)
CHLORIDE SERPL-SCNC: 109 MMOL/L (ref 98–107)
CO2 SERPL-SCNC: 15 MMOL/L (ref 21–32)
CO2 SERPL-SCNC: 19 MMOL/L (ref 21–32)
CREAT SERPL-MCNC: 0.46 MG/DL (ref 0.6–1)
CREAT SERPL-MCNC: 0.58 MG/DL (ref 0.6–1)
DIFFERENTIAL METHOD BLD: ABNORMAL
EOSINOPHIL # BLD: 0 K/UL (ref 0–0.8)
EOSINOPHIL NFR BLD: 0 % (ref 0.5–7.8)
ERYTHROCYTE [DISTWIDTH] IN BLOOD BY AUTOMATED COUNT: 13.2 %
EST. AVERAGE GLUCOSE BLD GHB EST-MCNC: 229 MG/DL
GLUCOSE BLD STRIP.AUTO-MCNC: 116 MG/DL (ref 65–100)
GLUCOSE BLD STRIP.AUTO-MCNC: 205 MG/DL (ref 65–100)
GLUCOSE BLD STRIP.AUTO-MCNC: 266 MG/DL (ref 65–100)
GLUCOSE BLD STRIP.AUTO-MCNC: 306 MG/DL (ref 65–100)
GLUCOSE SERPL-MCNC: 167 MG/DL (ref 65–100)
GLUCOSE SERPL-MCNC: 307 MG/DL (ref 65–100)
HBA1C MFR BLD: 9.6 % (ref 4.8–6)
HCT VFR BLD AUTO: 31.5 % (ref 35.8–46.3)
HGB BLD-MCNC: 11.4 G/DL (ref 11.7–15.4)
IMM GRANULOCYTES # BLD: 0 K/UL (ref 0–0.5)
IMM GRANULOCYTES NFR BLD AUTO: 0 % (ref 0–5)
LYMPHOCYTES # BLD: 1.1 K/UL (ref 0.5–4.6)
LYMPHOCYTES NFR BLD: 38 % (ref 13–44)
MAGNESIUM SERPL-MCNC: 1.9 MG/DL (ref 1.8–2.4)
MCH RBC QN AUTO: 35.2 PG (ref 26.1–32.9)
MCHC RBC AUTO-ENTMCNC: 36.2 G/DL (ref 31.4–35)
MCV RBC AUTO: 97.2 FL (ref 79.6–97.8)
MONOCYTES # BLD: 0.4 K/UL (ref 0.1–1.3)
MONOCYTES NFR BLD: 13 % (ref 4–12)
NEUTS SEG # BLD: 1.5 K/UL (ref 1.7–8.2)
NEUTS SEG NFR BLD: 49 % (ref 43–78)
NRBC # BLD: 0 K/UL (ref 0–0.2)
PLATELET # BLD AUTO: 122 K/UL (ref 150–450)
PMV BLD AUTO: 10.5 FL (ref 9.4–12.3)
POTASSIUM SERPL-SCNC: 3.6 MMOL/L (ref 3.5–5.1)
POTASSIUM SERPL-SCNC: 4 MMOL/L (ref 3.5–5.1)
RBC # BLD AUTO: 3.24 M/UL (ref 4.05–5.2)
SODIUM SERPL-SCNC: 134 MMOL/L (ref 136–145)
SODIUM SERPL-SCNC: 138 MMOL/L (ref 136–145)
WBC # BLD AUTO: 3 K/UL (ref 4.3–11.1)

## 2018-11-10 PROCEDURE — 74011636637 HC RX REV CODE- 636/637: Performed by: INTERNAL MEDICINE

## 2018-11-10 PROCEDURE — 80048 BASIC METABOLIC PNL TOTAL CA: CPT

## 2018-11-10 PROCEDURE — 74011250636 HC RX REV CODE- 250/636: Performed by: INTERNAL MEDICINE

## 2018-11-10 PROCEDURE — 74011000250 HC RX REV CODE- 250: Performed by: INTERNAL MEDICINE

## 2018-11-10 PROCEDURE — 36415 COLL VENOUS BLD VENIPUNCTURE: CPT

## 2018-11-10 PROCEDURE — 74011250636 HC RX REV CODE- 250/636: Performed by: HOSPITALIST

## 2018-11-10 PROCEDURE — 74011250637 HC RX REV CODE- 250/637: Performed by: INTERNAL MEDICINE

## 2018-11-10 PROCEDURE — 85025 COMPLETE CBC W/AUTO DIFF WBC: CPT

## 2018-11-10 PROCEDURE — 83735 ASSAY OF MAGNESIUM: CPT

## 2018-11-10 PROCEDURE — 65270000029 HC RM PRIVATE

## 2018-11-10 PROCEDURE — 83036 HEMOGLOBIN GLYCOSYLATED A1C: CPT

## 2018-11-10 PROCEDURE — 82962 GLUCOSE BLOOD TEST: CPT

## 2018-11-10 RX ORDER — INSULIN LISPRO 100 [IU]/ML
5 INJECTION, SOLUTION INTRAVENOUS; SUBCUTANEOUS
Status: DISCONTINUED | OUTPATIENT
Start: 2018-11-10 | End: 2018-11-10

## 2018-11-10 RX ORDER — INSULIN LISPRO 100 [IU]/ML
8 INJECTION, SOLUTION INTRAVENOUS; SUBCUTANEOUS
Status: DISCONTINUED | OUTPATIENT
Start: 2018-11-10 | End: 2018-11-10

## 2018-11-10 RX ORDER — INSULIN LISPRO 100 [IU]/ML
6 INJECTION, SOLUTION INTRAVENOUS; SUBCUTANEOUS
Status: DISCONTINUED | OUTPATIENT
Start: 2018-11-10 | End: 2018-11-11

## 2018-11-10 RX ORDER — INSULIN LISPRO 100 [IU]/ML
INJECTION, SOLUTION INTRAVENOUS; SUBCUTANEOUS
Status: DISCONTINUED | OUTPATIENT
Start: 2018-11-10 | End: 2018-11-12 | Stop reason: HOSPADM

## 2018-11-10 RX ORDER — INSULIN GLARGINE 100 [IU]/ML
15 INJECTION, SOLUTION SUBCUTANEOUS
Status: DISCONTINUED | OUTPATIENT
Start: 2018-11-10 | End: 2018-11-12 | Stop reason: HOSPADM

## 2018-11-10 RX ADMIN — CEPHALEXIN 500 MG: 500 CAPSULE ORAL at 12:34

## 2018-11-10 RX ADMIN — PROMETHAZINE HYDROCHLORIDE 12.5 MG: 25 INJECTION INTRAMUSCULAR; INTRAVENOUS at 16:49

## 2018-11-10 RX ADMIN — INSULIN GLARGINE 15 UNITS: 100 INJECTION, SOLUTION SUBCUTANEOUS at 21:47

## 2018-11-10 RX ADMIN — INSULIN LISPRO 8 UNITS: 100 INJECTION, SOLUTION INTRAVENOUS; SUBCUTANEOUS at 21:49

## 2018-11-10 RX ADMIN — CEPHALEXIN 500 MG: 500 CAPSULE ORAL at 17:47

## 2018-11-10 RX ADMIN — CEPHALEXIN 500 MG: 500 CAPSULE ORAL at 05:18

## 2018-11-10 RX ADMIN — Medication 400 MG: at 17:47

## 2018-11-10 RX ADMIN — CEPHALEXIN 500 MG: 500 CAPSULE ORAL at 00:36

## 2018-11-10 RX ADMIN — Medication 400 MG: at 08:43

## 2018-11-10 RX ADMIN — SODIUM CHLORIDE AND POTASSIUM CHLORIDE: 4.5; 1.49 INJECTION, SOLUTION INTRAVENOUS at 05:18

## 2018-11-10 RX ADMIN — CEPHALEXIN 500 MG: 500 CAPSULE ORAL at 23:31

## 2018-11-10 RX ADMIN — INSULIN LISPRO 5 UNITS: 100 INJECTION, SOLUTION INTRAVENOUS; SUBCUTANEOUS at 08:42

## 2018-11-10 RX ADMIN — INSULIN LISPRO 6 UNITS: 100 INJECTION, SOLUTION INTRAVENOUS; SUBCUTANEOUS at 12:31

## 2018-11-10 RX ADMIN — ONDANSETRON HYDROCHLORIDE 4 MG: 2 INJECTION, SOLUTION INTRAVENOUS at 13:44

## 2018-11-10 NOTE — PROGRESS NOTES
Bedside, Verbal and Written shift change report given to Leena Laguerre RN (oncoming nurse) by Yara Oliva RN (offgoing nurse). Report included the following information SBAR, Kardex, ED Summary, Intake/Output, MAR, Recent Results and Cardiac Rhythm Sinus Tach. Insulin gtt dual verified.

## 2018-11-10 NOTE — PROGRESS NOTES
Hospitalist Progress Note 11/10/2018 Admit Date: 2018  4:19 PM  
NAME: Shaniqua Wilson :  1995 MRN:  [id number] Attending: Joana Jung MD 
PCP:  Jose Alberto Castañeda MD 
 
SUBJECTIVE:  
20yo female with h/o anxiety who presented in DKA, new onset DM1. Transitioned off insulin gtt evening of . Pt feeling well. Appetite good. Denies CP, SOB, N/V. Review of Systems negative with exception of pertinent positives noted above PHYSICAL EXAM  
 
Visit Vitals /76 Pulse 90 Temp 98.9 °F (37.2 °C) Resp (!) 39 Ht 5' 2\" (1.575 m) Wt 40.9 kg (90 lb 2.7 oz) SpO2 100% BMI 16.49 kg/m² Temp (24hrs), Av.9 °F (37.2 °C), Min:98.6 °F (37 °C), Max:99.1 °F (37.3 °C) Oxygen Therapy O2 Sat (%): 100 % (11/10/18 09) Pulse via Oximetry: 94 beats per minute (11/10/18 0901) O2 Device: Room air (11/10/18 0407) Intake/Output Summary (Last 24 hours) at 11/10/2018 1130 Last data filed at 11/10/2018 9836 Gross per 24 hour Intake 3238.33 ml Output 2900 ml Net 338.33 ml General: No acute distress   
Neck:  No LAD Lungs:  CTA Bilaterally Heart:  Regular rate and rhythm,  No murmur, rub, or gallop Abdomen: Soft, Non distended, Non tender, Positive bowel sounds Extremities: No cyanosis, clubbing or edema Neurologic:  No focal deficits Psych:  Calm, cooperative ASSESSMENT Active Hospital Problems Diagnosis Date Noted  DKA (diabetic ketoacidoses) ([facility] [address] 2018  Abscess of right breast 2018 DKA Newly diagnosed DM1 A1c 10. 
- transitioned off insulin gtt yesterday evening 
- bicarb improving, AG closed 
- glucose up to 300s, increase Lantus to 15 units, Humalog to 6 units TID 
- SSI 
- has met with diabetic educator - plan for outpt f/u with endo in 1 week Electrolyte abnormalities 
- replace 
- follow Breast abscess 
- cont Keflex (on prior to admit) Proph - heparin SQ Full code Dispo - likely home tomorrow if glucoses stable Signed By: Erin Redd MD   
 November 10, 2018

## 2018-11-10 NOTE — PROGRESS NOTES
11/10/18 1330 Dual Skin Pressure Injury Assessment Second Care Provider (Based on [facility]) Nathalie Chiu

## 2018-11-10 NOTE — PROGRESS NOTES
Spoke with Dr. Dakota Lunsford regarding patient's BMP results. Orders received to administer 8 units of Lantus and to discontinue insulin gtt 30 minutes from administration.

## 2018-11-10 NOTE — PROGRESS NOTES
TRANSFER - IN REPORT: 
 
Verbal report received from [facility] on Daniel White  being received from ICU for routine progression of care Report consisted of patients Situation, Background, Assessment and  
Recommendations(SBAR). Information from the following report(s) Kardex was reviewed with the receiving nurse. Opportunity for questions and clarification was provided. Assessment completed upon patients arrival to unit and care assumed.

## 2018-11-10 NOTE — PROGRESS NOTES
Patient was calm with loving family at bedside Offered supportive presence to all No concerns voiced Ramila Demarco, staff [address], [id number] [facility] Jameel  /   [email or web address]

## 2018-11-10 NOTE — PROGRESS NOTES
[employer] stopped per MD, and Lantus administered 30 minutes prior. Patient's BG 78, and 8 oz of juice given. Will continue to monitor.

## 2018-11-10 NOTE — PROGRESS NOTES
Nutrition Referral received from [employer]ening [employer] for recently lost weight  ( 14-23 pounds) without trying and eating poorly d/t decreased appetite Patient and visitor both currently asleep. RD did not awaken Pt received [facility] DM education yesterday for newly diagnosed DM. Weight hx per EMR ( Based on [facility] care functionality, RD cannot know if these weight are actual versus stated): WT / BMI WEIGHT  
11/9/2018 90 lb 2.7 oz  
10/11/2018 98 lb  
9/17/2018 100 lb 3.2 oz  
12/14/2017 102 lb 6 oz  
11/13/2017 101 lb 4 oz  
 ~10% change in wt within ~1 month c/w severe change. Weight loss associated with newly diagnosed DM given pt with polyuria and polydipsia for 1-2 weeks PTA. Intake/Comparative Standards: Average intake for past 2 day(s)/2 recorded meal(s): 30%. 2 meals does not represent a trend. Therefore will defer assessment of intake at this time. Noted pt with good appetite per MD progress note this AM. No additional nutrition interventions indicated at this time F/U per nutrition standard of care. Jm Mosley, [address], [address] [place] [address]

## 2018-11-10 NOTE — PROGRESS NOTES
Bedside shift report received from [facility]. Patient is alert and oriented and denies any needs at this time.

## 2018-11-10 NOTE — PROGRESS NOTES
TRANSFER - OUT REPORT: 
 
Verbal report given to Flavia Dowd (name) on Richard Li  being transferred to [facility](unit) for routine progression of care Report consisted of patients Situation, Background, Assessment and  
Recommendations(SBAR). Information from the following report(s) SBAR was reviewed with the receiving nurse. Opportunity for questions and clarification was provided. Patient transported with: 
 Silent Herdsman

## 2018-11-11 PROBLEM — E87.6 HYPOKALEMIA: Status: ACTIVE | Noted: 2018-11-11

## 2018-11-11 LAB
ANION GAP SERPL CALC-SCNC: 8 MMOL/L (ref 7–16)
BASOPHILS # BLD: 0 K/UL (ref 0–0.2)
BASOPHILS NFR BLD: 0 % (ref 0–2)
BUN SERPL-MCNC: 6 MG/DL (ref 6–23)
CALCIUM SERPL-MCNC: 8.7 MG/DL (ref 8.3–10.4)
CHLORIDE SERPL-SCNC: 106 MMOL/L (ref 98–107)
CO2 SERPL-SCNC: 27 MMOL/L (ref 21–32)
CREAT SERPL-MCNC: 0.34 MG/DL (ref 0.6–1)
DIFFERENTIAL METHOD BLD: ABNORMAL
EOSINOPHIL # BLD: 0 K/UL (ref 0–0.8)
EOSINOPHIL NFR BLD: 1 % (ref 0.5–7.8)
ERYTHROCYTE [DISTWIDTH] IN BLOOD BY AUTOMATED COUNT: 12.1 %
GLUCOSE BLD STRIP.AUTO-MCNC: 102 MG/DL (ref 65–100)
GLUCOSE BLD STRIP.AUTO-MCNC: 151 MG/DL (ref 65–100)
GLUCOSE BLD STRIP.AUTO-MCNC: 195 MG/DL (ref 65–100)
GLUCOSE BLD STRIP.AUTO-MCNC: 294 MG/DL (ref 65–100)
GLUCOSE BLD STRIP.AUTO-MCNC: 320 MG/DL (ref 65–100)
GLUCOSE SERPL-MCNC: 100 MG/DL (ref 65–100)
HCT VFR BLD AUTO: 34 % (ref 35.8–46.3)
HGB BLD-MCNC: 12.4 G/DL (ref 11.7–15.4)
IMM GRANULOCYTES # BLD: 0 K/UL (ref 0–0.5)
IMM GRANULOCYTES NFR BLD AUTO: 0 % (ref 0–5)
LYMPHOCYTES # BLD: 1.3 K/UL (ref 0.5–4.6)
LYMPHOCYTES NFR BLD: 48 % (ref 13–44)
MCH RBC QN AUTO: 34.8 PG (ref 26.1–32.9)
MCHC RBC AUTO-ENTMCNC: 36.5 G/DL (ref 31.4–35)
MCV RBC AUTO: 95.5 FL (ref 79.6–97.8)
MONOCYTES # BLD: 0.4 K/UL (ref 0.1–1.3)
MONOCYTES NFR BLD: 14 % (ref 4–12)
NEUTS SEG # BLD: 1 K/UL (ref 1.7–8.2)
NEUTS SEG NFR BLD: 37 % (ref 43–78)
NRBC # BLD: 0 K/UL (ref 0–0.2)
PLATELET # BLD AUTO: 134 K/UL (ref 150–450)
PMV BLD AUTO: 10.7 FL (ref 9.4–12.3)
POTASSIUM SERPL-SCNC: 3 MMOL/L (ref 3.5–5.1)
RBC # BLD AUTO: 3.56 M/UL (ref 4.05–5.2)
SODIUM SERPL-SCNC: 141 MMOL/L (ref 136–145)
WBC # BLD AUTO: 2.7 K/UL (ref 4.3–11.1)

## 2018-11-11 PROCEDURE — 74011636637 HC RX REV CODE- 636/637: Performed by: INTERNAL MEDICINE

## 2018-11-11 PROCEDURE — 65270000029 HC RM PRIVATE

## 2018-11-11 PROCEDURE — 74011250637 HC RX REV CODE- 250/637: Performed by: INTERNAL MEDICINE

## 2018-11-11 PROCEDURE — 80048 BASIC METABOLIC PNL TOTAL CA: CPT

## 2018-11-11 PROCEDURE — 85025 COMPLETE CBC W/AUTO DIFF WBC: CPT

## 2018-11-11 PROCEDURE — 82962 GLUCOSE BLOOD TEST: CPT

## 2018-11-11 PROCEDURE — 36415 COLL VENOUS BLD VENIPUNCTURE: CPT

## 2018-11-11 RX ORDER — ESCITALOPRAM OXALATE 10 MG/1
10 TABLET ORAL EVERY EVENING
Status: DISCONTINUED | OUTPATIENT
Start: 2018-11-11 | End: 2018-11-11

## 2018-11-11 RX ORDER — ESCITALOPRAM OXALATE 10 MG/1
20 TABLET ORAL EVERY EVENING
Status: DISCONTINUED | OUTPATIENT
Start: 2018-11-11 | End: 2018-11-12 | Stop reason: HOSPADM

## 2018-11-11 RX ORDER — INSULIN LISPRO 100 [IU]/ML
3 INJECTION, SOLUTION INTRAVENOUS; SUBCUTANEOUS
Status: DISCONTINUED | OUTPATIENT
Start: 2018-11-11 | End: 2018-11-11

## 2018-11-11 RX ORDER — POTASSIUM CHLORIDE 20 MEQ/1
40 TABLET, EXTENDED RELEASE ORAL DAILY
Status: DISCONTINUED | OUTPATIENT
Start: 2018-11-11 | End: 2018-11-12 | Stop reason: HOSPADM

## 2018-11-11 RX ORDER — INSULIN LISPRO 100 [IU]/ML
5 INJECTION, SOLUTION INTRAVENOUS; SUBCUTANEOUS
Status: DISCONTINUED | OUTPATIENT
Start: 2018-11-11 | End: 2018-11-12 | Stop reason: HOSPADM

## 2018-11-11 RX ADMIN — CEPHALEXIN 500 MG: 500 CAPSULE ORAL at 17:38

## 2018-11-11 RX ADMIN — INSULIN LISPRO 2 UNITS: 100 INJECTION, SOLUTION INTRAVENOUS; SUBCUTANEOUS at 17:38

## 2018-11-11 RX ADMIN — Medication 400 MG: at 09:13

## 2018-11-11 RX ADMIN — INSULIN LISPRO 6 UNITS: 100 INJECTION, SOLUTION INTRAVENOUS; SUBCUTANEOUS at 12:13

## 2018-11-11 RX ADMIN — INSULIN LISPRO 5 UNITS: 100 INJECTION, SOLUTION INTRAVENOUS; SUBCUTANEOUS at 17:37

## 2018-11-11 RX ADMIN — Medication 400 MG: at 17:38

## 2018-11-11 RX ADMIN — INSULIN LISPRO 8 UNITS: 100 INJECTION, SOLUTION INTRAVENOUS; SUBCUTANEOUS at 22:10

## 2018-11-11 RX ADMIN — CEPHALEXIN 500 MG: 500 CAPSULE ORAL at 12:14

## 2018-11-11 RX ADMIN — CEPHALEXIN 500 MG: 500 CAPSULE ORAL at 05:14

## 2018-11-11 RX ADMIN — ESCITALOPRAM OXALATE 20 MG: 10 TABLET ORAL at 17:38

## 2018-11-11 RX ADMIN — POTASSIUM CHLORIDE 40 MEQ: 20 TABLET, EXTENDED RELEASE ORAL at 09:13

## 2018-11-11 RX ADMIN — CEPHALEXIN 500 MG: 500 CAPSULE ORAL at 23:29

## 2018-11-11 RX ADMIN — INSULIN GLARGINE 15 UNITS: 100 INJECTION, SOLUTION SUBCUTANEOUS at 22:06

## 2018-11-11 RX ADMIN — INSULIN LISPRO 3 UNITS: 100 INJECTION, SOLUTION INTRAVENOUS; SUBCUTANEOUS at 12:13

## 2018-11-11 NOTE — PROGRESS NOTES
Hospitalist Progress Note Admit Date:  2018  4:19 PM  
Name:  Asim Mendoza Age:  20 y.o. 
:  1995 MRN:  [id number] PCP:  Cristel Palmer MD 
Treatment Team: Attending Provider: Yumiko Deleon MD; Consulting Provider: Ever Danielle MD; Care Manager: Daniel Lu RN; Utilization Review: Venus Gamino RN; Hospitalist: Suleman Plascencia MD 
 
Subjective:  
Patient sleeping comfortably. Ate most of dinner last night but previously had no appetite. No abdo pain, intermittent nausea w/o vomiting. Has not tried to eat yet this morning. AM sugars much better than yesterday. ROS otherwise negative. Objective:  
 
Patient Vitals for the past 24 hrs: 
 Temp Pulse Resp BP SpO2  
18 0743 98.1 °F (36.7 °C) 86 18 93/61 99 % 18 0330 97.8 °F (36.6 °C) 77 18 91/61 98 % 11/10/18 2316 98.2 °F (36.8 °C) 93 18 93/62 98 % 11/10/18 1900 98.2 °F (36.8 °C) 93 18 91/56 97 % 11/10/18 1529 98.2 °F (36.8 °C) 95 19 102/66 99 % 11/10/18 1316 98.4 °F (36.9 °C) 93 20 108/77 98 % 11/10/18 1250 98.8 °F (37.1 °C)      
11/10/18 1201  92 (!) 33 99/72 100 % 11/10/18 1101  99 16 98/70 100 % 11/10/18 1001  (!) 104 21 104/73 100 % 11/10/18 0901 98.9 °F (37.2 °C) 90 18 108/76 100 % Oxygen Therapy O2 Sat (%): 99 % (18 0743) Pulse via Oximetry: 92 beats per minute (11/10/18 1201) O2 Device: Room air (11/10/18 0800) Intake/Output Summary (Last 24 hours) at 2018 0830 Last data filed at 11/10/2018 2316 Gross per 24 hour Intake 240 ml Output 800 ml Net -560 ml  
   
*Note that automatically entered I/Os may not be accurate; dependent on patient compliance with collection and accurate  by assistants. General:    Thing. Alert. CV:   RRR. No murmur, rub, or gallop. Lungs:   CTAB. No wheezing, rhonchi, or rales. Abdomen:   Soft, nontender, nondistended. Extremities: Warm and dry. No cyanosis or edema. Skin:     No rashes or jaundice. Neuro:  No gross focal deficits Data Review: 
I have reviewed all labs, meds, telemetry events, and studies from the last 24 hours: 
 
Recent Results (from the past 24 hour(s)) METABOLIC PANEL, BASIC Collection Time: 11/10/18 10:35 AM  
Result Value Ref Range Sodium 134 (L) 136 - 145 mmol/L Potassium 3.6 3.5 - 5.1 mmol/L Chloride 104 98 - 107 mmol/L  
 CO2 19 (L) 21 - 32 mmol/L Anion gap 11 7 - 16 mmol/L Glucose 307 (H) 65 - 100 mg/dL BUN 2 (L) 6 - 23 MG/DL Creatinine 0.58 (L) 0.6 - 1.0 MG/DL  
 GFR est AA >60 >60 ml/min/1.73m2 GFR est non-AA >60 >60 ml/min/1.73m2 Calcium 8.5 8.3 - 10.4 MG/DL  
HEMOGLOBIN A1C WITH EAG Collection Time: 11/10/18 10:35 AM  
Result Value Ref Range Hemoglobin A1c 9.6 (H) 4.8 - 6.0 % Est. average glucose 229 mg/dL GLUCOSE, POC Collection Time: 11/10/18 11:57 AM  
Result Value Ref Range Glucose (POC) 266 (H) 65 - 100 mg/dL GLUCOSE, POC Collection Time: 11/10/18  4:34 PM  
Result Value Ref Range Glucose (POC) 116 (H) 65 - 100 mg/dL GLUCOSE, POC Collection Time: 11/10/18  9:09 PM  
Result Value Ref Range Glucose (POC) 306 (H) 65 - 100 mg/dL CBC WITH AUTOMATED DIFF Collection Time: 11/11/18  4:27 AM  
Result Value Ref Range WBC 2.7 (L) 4.3 - 11.1 K/uL  
 RBC 3.56 (L) 4.05 - 5.2 M/uL  
 HGB 12.4 11.7 - 15.4 g/dL HCT 34.0 (L) 35.8 - 46.3 % MCV 95.5 79.6 - 97.8 FL  
 MCH 34.8 (H) 26.1 - 32.9 PG  
 MCHC 36.5 (H) 31.4 - 35.0 g/dL  
 RDW 12.1 % PLATELET 974 (L) 037 - 450 K/uL MPV 10.7 9.4 - 12.3 FL ABSOLUTE NRBC 0.00 0.0 - 0.2 K/uL  
 DF AUTOMATED NEUTROPHILS 37 (L) 43 - 78 % LYMPHOCYTES 48 (H) 13 - 44 % MONOCYTES 14 (H) 4.0 - 12.0 % EOSINOPHILS 1 0.5 - 7.8 % BASOPHILS 0 0.0 - 2.0 % IMMATURE GRANULOCYTES 0 0.0 - 5.0 %  
 ABS. NEUTROPHILS 1.0 (L) 1.7 - 8.2 K/UL  
 ABS. LYMPHOCYTES 1.3 0.5 - 4.6 K/UL  
 ABS. MONOCYTES 0.4 0.1 - 1.3 K/UL ABS. EOSINOPHILS 0.0 0.0 - 0.8 K/UL  
 ABS. BASOPHILS 0.0 0.0 - 0.2 K/UL  
 ABS. IMM. GRANS. 0.0 0.0 - 0.5 K/UL METABOLIC PANEL, BASIC Collection Time: 11/11/18  4:27 AM  
Result Value Ref Range Sodium 141 136 - 145 mmol/L Potassium 3.0 (L) 3.5 - 5.1 mmol/L Chloride 106 98 - 107 mmol/L  
 CO2 27 21 - 32 mmol/L Anion gap 8 7 - 16 mmol/L Glucose 100 65 - 100 mg/dL BUN 6 6 - 23 MG/DL Creatinine 0.34 (L) 0.6 - 1.0 MG/DL  
 GFR est AA >60 >60 ml/min/1.73m2 GFR est non-AA >60 >60 ml/min/1.73m2 Calcium 8.7 8.3 - 10.4 MG/DL  
GLUCOSE, POC Collection Time: 11/11/18  6:00 AM  
Result Value Ref Range Glucose (POC) 102 (H) 65 - 100 mg/dL All Micro Results None No results found for this visit on 11/08/18. Current Meds: 
Current Facility-Administered Medications Medication Dose Route Frequency  potassium chloride (K-DUR, KLOR-CON) SR tablet 40 mEq  40 mEq Oral DAILY  insulin lispro (HUMALOG) injection 3 Units  3 Units SubCUTAneous TIDAC  insulin glargine (LANTUS) injection 15 Units  15 Units SubCUTAneous QHS  insulin lispro (HUMALOG) injection   SubCUTAneous AC&HS  promethazine (PHENERGAN) with saline injection 12.5 mg  12.5 mg IntraVENous Q6H PRN  
 magnesium oxide (MAG-OX) tablet 400 mg  400 mg Oral BID  phenol throat spray (CHLORASEPTIC) 1 Spray  1 Spray Oral PRN  
 dextrose 40% (GLUTOSE) oral gel 1 Tube  15 g Oral PRN  
 glucagon (GLUCAGEN) injection 1 mg  1 mg IntraMUSCular PRN  
 cephALEXin (KEFLEX) capsule 500 mg  500 mg Oral Q6H  
 ondansetron (ZOFRAN) injection 4 mg  4 mg IntraVENous Q4H PRN Other Studies (last 24 hours): No results found. Assessment and Plan:  
 
Hospital Problems as of 11/11/2018 Date Reviewed: 10/11/2018 Codes Class Noted - Resolved POA Hypokalemia ICD-10-CM: E87.6 ICD-9-CM: 276.8  11/11/2018 - Present Unknown * (Principal) DKA (diabetic ketoacidoses) ([facility] ICD-10-CM: E13.10 ICD-9-CM: 250.10  11/8/2018 - Present Unknown Abscess of right breast ICD-10-CM: N61.1 ICD-9-CM: 611.0  11/8/2018 - Present Unknown Plan: # New onset DM1 
 - A1C 10; presented on 11/8 in DKA 
 - Lantus 15U qhs seems appropriate based on AM sugars - Will cut prandial in half to 3U with meals, hold this AM with BS of 102 and brittle appetite. Nurse to call with lunch time sugars before giving prandial dose. - Outpt referral to Dr. Pratima Damian at discharge # HypoK - Replete # Breast abscess 
 - con't Keflex (on prior to admission) DC planning/Dispo:  Monitor sugars and appetite through this afternoon. Possible dc later today or tomorrow AM pending labs and appetite. Diet:  DIET DIABETIC WITH OPTIONS 
DIET NUTRITIONAL SUPPLEMENTS 
DIET NUTRITIONAL SUPPLEMENTS 
DVT ppx:  ambulation Signed: 
Justina Barone MD

## 2018-11-11 NOTE — PROGRESS NOTES
END OF SHIFT NOTE: 
 
INTAKE/OUTPUT 
11/10 0701 - 11/11 0700 In: 240 [P.O.:240] Out: 800 [Urine:800] Voiding: YES Catheter: NO 
Drain:   
 
 
 
 
 
Flatus: Patient does have flatus present. Stool:  0 occurrences. Characteristics: 
  
 
Emesis: 0 occurrences. Characteristics: VITAL SIGNS Patient Vitals for the past 12 hrs: 
 Temp Pulse Resp BP SpO2  
11/11/18 0330 97.8 °F (36.6 °C) 77 18 91/61 98 % 11/10/18 2316 98.2 °F (36.8 °C) 93 18 93/62 98 % 11/10/18 1900 98.2 °F (36.8 °C) 93 18 91/56 97 % Pain Assessment Pain Intensity 1: 0 (11/10/18 1348) Patient Stated Pain Goal: 0 Ambulating Yes Went over  Actions of Lantus/humalog insulin and signs/symptoms of hypo/hyper glycemia. Shift report given to oncoming nurse at the bedside.  
 
Victorino Nunez RN

## 2018-11-12 VITALS
BODY MASS INDEX: 16.59 KG/M2 | OXYGEN SATURATION: 98 % | TEMPERATURE: 97.9 F | WEIGHT: 90.17 LBS | DIASTOLIC BLOOD PRESSURE: 60 MMHG | HEART RATE: 89 BPM | HEIGHT: 62 IN | SYSTOLIC BLOOD PRESSURE: 93 MMHG | RESPIRATION RATE: 18 BRPM

## 2018-11-12 PROBLEM — N61.1 ABSCESS OF RIGHT BREAST: Status: RESOLVED | Noted: 2018-11-08 | Resolved: 2018-11-12

## 2018-11-12 PROBLEM — E11.10 DKA (DIABETIC KETOACIDOSES): Status: RESOLVED | Noted: 2018-11-08 | Resolved: 2018-11-12

## 2018-11-12 LAB
ANION GAP SERPL CALC-SCNC: 8 MMOL/L (ref 7–16)
BUN SERPL-MCNC: 6 MG/DL (ref 6–23)
CALCIUM SERPL-MCNC: 8.7 MG/DL (ref 8.3–10.4)
CHLORIDE SERPL-SCNC: 107 MMOL/L (ref 98–107)
CO2 SERPL-SCNC: 30 MMOL/L (ref 21–32)
CREAT SERPL-MCNC: 0.4 MG/DL (ref 0.6–1)
GLUCOSE BLD STRIP.AUTO-MCNC: 98 MG/DL (ref 65–100)
GLUCOSE SERPL-MCNC: 93 MG/DL (ref 65–100)
POTASSIUM SERPL-SCNC: 3.1 MMOL/L (ref 3.5–5.1)
SODIUM SERPL-SCNC: 145 MMOL/L (ref 136–145)

## 2018-11-12 PROCEDURE — 82962 GLUCOSE BLOOD TEST: CPT

## 2018-11-12 PROCEDURE — 74011250637 HC RX REV CODE- 250/637: Performed by: INTERNAL MEDICINE

## 2018-11-12 PROCEDURE — 36415 COLL VENOUS BLD VENIPUNCTURE: CPT

## 2018-11-12 PROCEDURE — 80048 BASIC METABOLIC PNL TOTAL CA: CPT

## 2018-11-12 PROCEDURE — 74011636637 HC RX REV CODE- 636/637: Performed by: INTERNAL MEDICINE

## 2018-11-12 RX ORDER — INSULIN LISPRO 100 [IU]/ML
5 INJECTION, SOLUTION INTRAVENOUS; SUBCUTANEOUS
Qty: 1 PACKAGE | Refills: 1 | Status: SHIPPED | OUTPATIENT
Start: 2018-11-12 | End: 2018-11-29 | Stop reason: SDUPTHER

## 2018-11-12 RX ORDER — PEN NEEDLE, DIABETIC 30 GX3/16"
NEEDLE, DISPOSABLE MISCELLANEOUS
Qty: 2 PACKAGE | Refills: 2 | Status: SHIPPED | OUTPATIENT
Start: 2018-11-12 | End: 2018-11-29 | Stop reason: SDUPTHER

## 2018-11-12 RX ORDER — POTASSIUM CHLORIDE 20 MEQ/1
40 TABLET, EXTENDED RELEASE ORAL DAILY
Qty: 14 TAB | Refills: 0 | Status: SHIPPED | OUTPATIENT
Start: 2018-11-12 | End: 2018-11-29 | Stop reason: ALTCHOICE

## 2018-11-12 RX ORDER — POTASSIUM CHLORIDE 20 MEQ/1
40 TABLET, EXTENDED RELEASE ORAL ONCE
Status: DISCONTINUED | OUTPATIENT
Start: 2018-11-12 | End: 2018-11-12

## 2018-11-12 RX ORDER — INSULIN GLARGINE 100 [IU]/ML
15 INJECTION, SOLUTION SUBCUTANEOUS
Qty: 1 PEN | Refills: 1 | Status: SHIPPED | OUTPATIENT
Start: 2018-11-12 | End: 2018-11-29 | Stop reason: SDUPTHER

## 2018-11-12 RX ADMIN — Medication 400 MG: at 08:17

## 2018-11-12 RX ADMIN — POTASSIUM CHLORIDE 40 MEQ: 20 TABLET, EXTENDED RELEASE ORAL at 08:17

## 2018-11-12 RX ADMIN — INSULIN LISPRO 5 UNITS: 100 INJECTION, SOLUTION INTRAVENOUS; SUBCUTANEOUS at 08:17

## 2018-11-12 RX ADMIN — CEPHALEXIN 500 MG: 500 CAPSULE ORAL at 05:58

## 2018-11-12 NOTE — PROGRESS NOTES
Date of Outreach Update: 
Raya Dove was seen and assessed. Previous Outreach assessment has been reviewed. Patient sleeping, no signs of distress. Primary RN has no complaints at this time. There have been no significant clinical changes since the completion of the last dated Outreach assessment. Will continue to follow up per outreach protocol. Signed By:   Reagan Maya   November 12, 2018 5:32 AM

## 2018-11-12 NOTE — DIABETES MGMT
Patient admitted with Hypokalemia. Blood glucose range yesterday 100-320 with pt receiving Lantus 15 units and Humalog 24 units. This AM blood glucose 98. Educator following up with patient on new diagnosis. Per [facility] note patient practiced self administration of insulin successfully. Pt verbalizes that she is comfortable with insulin self-injection. Encouraged compliance with discharge regimen. Encouraged patient to continue to work on lifestyle modifications and to follow up with endocrinology for further titration of regimen. Patient and pt  verbalized understanding and voice no questions at this time.

## 2018-11-12 NOTE — CDMP QUERY
Please clarify if this patient is UNDERWEIGHT with Height 5' 2\", Weight 90 lbs, and BMI 16.49 Thanks, Tali Bravo RN Compliant Documentation Management Program 
[phone]

## 2018-11-12 NOTE — DISCHARGE INSTRUCTIONS
INITIATE INSULIN CORRECTIVE PROTOCOL:   Normal Insulin Sensitivity    For Blood Sugar (mg/dL) of:      Less than 150 =   0 units            150 -199 =   2 units   200 -249 =   4 units   250 -299 =   6 units   300 -349 =   8 units   350 and above = 10 units       Diabetes Blood Sugar Emergencies: Your Action Plan  How can you prevent a blood sugar emergency? An important part of living with diabetes is keeping your blood sugar in your target range. You'll need to know what to do if it's too high or too low. Managing your blood sugar levels helps you avoid emergencies. This care sheet will teach you about the signs of high and low blood sugar. It will help you make an action plan with your doctor for when these signs occur. Low blood sugar is more likely to happen if you take certain medicines for diabetes. It can also happen if you skip a meal, drink alcohol, or exercise more than usual.  You may get high blood sugar if you eat differently than you normally do. One example is eating more carbohydrate than usual. Having a cold, the flu, or other sudden illness can also cause high blood sugar levels. Levels can also rise if you miss a dose of medicine. Any change in how you take your medicine may affect your blood sugar level. So it's important to work with your doctor before you make any changes. Check your blood sugar  Work with your doctor to fill in the blank spaces below that apply to you. Track your levels, know your target range, and write down ways you can get your blood sugar back in your target range. A log book can help you track your levels. Take the book to all of your medical appointments. · Check your blood sugar _____ times a day, at these times:________________________________________________. (For example: Before meals, at bedtime, before exercise, during exercise, other.)  · Your blood sugar target range before a meal is ___________________.  Your blood sugar target range after a meal is _______________________. · Do this--___________________________________________________--to get your blood sugar back within your safe range if your blood sugar results are _________________________________________. (For example: Less than 70 or above 250 mg/dL.)  Call your doctor when your blood sugar results are ___________________________________. (For example: Less than 70 or above 250 mg/dL.)  What are the symptoms of low and high blood sugar? Common symptoms of low blood sugar are sweating and feeling shaky, weak, hungry, or confused. Symptoms can start quickly. Common symptoms of high blood sugar are feeling very thirsty or very hungry. You may also pass urine more often than usual. You may have blurry vision and may lose weight without trying. But some people may have high or low blood sugar without having any symptoms. That's a good reason to check your blood sugar on a regular schedule. What should you do if you have symptoms? Work with your doctor to fill in the blank spaces below that apply to you. Low blood sugar  If you have symptoms of low blood sugar, check your blood sugar. If it's below _____ ( for example, below 70), eat or drink a quick-sugar food that has about 15 grams of carbohydrate. Your goal is to get your level back to your safe range. Check your blood sugar again 15 minutes later. If it's still not in your target range, take another 15 grams of carbohydrate and check your blood sugar again in 15 minutes. Repeat this until you reach your target. Then go back to your regular testing schedule. When you have low blood sugar, it's best to stop or reduce any physical activity until your blood sugar is back in your target range and is stable. If you must stay active, eat or drink 30 grams of carbohydrate. Then check your blood sugar again in 15 minutes. If it's not in your target range, take another 30 grams of carbohydrates. Check your blood sugar again in 15 minutes.  Keep doing this until you reach your target. You can then go back to your regular testing schedule. If your symptoms or blood sugar levels are getting worse or have not improved after 15 minutes, seek medical care right away. Here are some examples of quick-sugar foods with 15 grams of carbohydrate:  · 3 or 4 glucose tablets  · 1 tube of glucose gel  · Hard candy (such as 3 Jolly Ranchers or 5 to 7 Life Savers)  · ½ cup to ¾ cup (4 to 6 ounces) of fruit juice or regular (not diet) soda  High blood sugar  If you have symptoms of high blood sugar, check your blood sugar. Your goal is to get your level back to your target range. If it's above ______ ( for example, above 250), follow these steps:  · If you missed a dose of your diabetes medicine, take it now. Take only the amount of medicine that you have been prescribed. Do not take more or less medicine. · Give yourself insulin if your doctor has prescribed it for high blood sugar. · Test for ketones, if the doctor told you to do so. If the results of the ketone test show a moderate-to-large amount of ketones, call the doctor for advice. · Wait 30 minutes after you take the extra insulin or the missed medicine. Check your blood sugar again. If your symptoms or blood sugar levels are getting worse or have not improved after taking these steps, seek medical care right away. Follow-up care is a key part of your treatment and safety. Be sure to make and go to all appointments, and call your doctor if you are having problems. It's also a good idea to know your test results and keep a list of the medicines you take. Where can you learn more? Go to http://rickey-mag.info/. Enter K837 in the search box to learn more about \"Diabetes Blood Sugar Emergencies: Your Action Plan. \"  Current as of: December 7, 2017  Content Version: 11.8  © 9077-9085 [employer].  Care instructions adapted under license by [employer] (which disclaims liability or warranty for this information). If you have questions about a medical condition or this instruction, always ask your healthcare professional. [address] any warranty or liability for your use of this information. Counting Carbohydrates: Care Instructions  Your Care Instructions    You don't have to eat special foods when you have diabetes. You just have to be careful to eat healthy foods. Carbohydrates (carbs) raise blood sugar higher and quicker than any other nutrient. Carbs are found in desserts, breads and cereals, and fruit. They're also in starchy vegetables. These include potatoes, corn, and grains such as rice and pasta. Carbs are also in milk and yogurt. The more carbs you eat at one time, the higher your blood sugar will rise. Spreading carbs all through the day helps keep your blood sugar levels within your target range. Counting carbs is one of the best ways to keep your blood sugar under control. If you use insulin, counting carbs helps you match the right amount of insulin to the number of grams of carbs in a meal. Then you can change your diet and insulin dose as needed. Testing your blood sugar several times a day can help you learn how carbs affect your blood sugar. A registered dietitian or certified diabetes educator can help you plan meals and snacks. Follow-up care is a key part of your treatment and safety. Be sure to make and go to all appointments, and call your doctor if you are having problems. It's also a good idea to know your test results and keep a list of the medicines you take. How can you care for yourself at home? Know your daily amount of carbohydrates  Your daily amount depends on several things, such as your weight, how active you are, which diabetes medicines you take, and what your goals are for your blood sugar levels.  A registered dietitian or certified diabetes educator can help you plan how many carbs to include in each meal and snack.  For most adults, a guideline for the daily amount of carbs is:  · 45 to 60 grams at each meal. That's about the same as 3 to 4 carbohydrate servings. · 15 to 20 grams at each snack. That's about the same as 1 carbohydrate serving. Count carbs  Counting carbs lets you know how much rapid-acting insulin to take before you eat. If you use an insulin pump, you get a constant rate of insulin during the day. So the pump must be programmed at meals. This gives you extra insulin to cover the rise in blood sugar after meals. If you take insulin:  · Learn your own insulin-to-carb ratio. You and your diabetes health professional will figure out the ratio. You can do this by testing your blood sugar after meals. For example, you may need a certain amount of insulin for every 15 grams of carbs. · Add up the carb grams in a meal. Then you can figure out how many units of insulin to take based on your insulin-to-carb ratio. · Exercise lowers blood sugar. You can use less insulin than you would if you were not doing exercise. Keep in mind that timing matters. If you exercise within 1 hour after a meal, your body may need less insulin for that meal than it would if you exercised 3 hours after the meal. Test your blood sugar to find out how exercise affects your need for insulin. If you do or don't take insulin:  · Look at labels on packaged foods. This can tell you how many carbs are in a serving. You can also use guides from the American Diabetes Association. · Be aware of portions, or serving sizes. If a package has two servings and you eat the whole package, you need to double the number of grams of carbohydrate listed for one serving. · Protein, fat, and fiber do not raise blood sugar as much as carbs do. If you eat a lot of these nutrients in a meal, your blood sugar will rise more slowly than it would otherwise. Eat from all food groups  · Eat at least three meals a day.   · Plan meals to include food from all the food groups. The food groups include grains, fruits, dairy, proteins, and vegetables. · Talk to your dietitian or diabetes educator about ways to add limited amounts of sweets into your meal plan. · If you drink alcohol, talk to your doctor. It may not be recommended when you are taking certain diabetes medicines. Where can you learn more? Go to http://rcikey-mag.info/. Enter I485 in the search box to learn more about \"Counting Carbohydrates: Care Instructions. \"  Current as of: December 7, 2017  Content Version: 11.8  © 1525-6117 [employer]. Care instructions adapted under license by [employer] (which disclaims liability or warranty for this information). If you have questions about a medical condition or this instruction, always ask your healthcare professional. [address] any warranty or liability for your use of this information. Learning About Diabetes Food Guidelines  Your Care Instructions    Meal planning is important to manage diabetes. It helps keep your blood sugar at a target level (which you set with your doctor). You don't have to eat special foods. You can eat what your family eats, including sweets once in a while. But you do have to pay attention to how often you eat and how much you eat of certain foods. You may want to work with a dietitian or a certified diabetes educator (CDE) to help you plan meals and snacks. A dietitian or CDE can also help you lose weight if that is one of your goals. What should you know about eating carbs? Managing the amount of carbohydrate (carbs) you eat is an important part of healthy meals when you have diabetes. Carbohydrate is found in many foods. · Learn which foods have carbs. And learn the amounts of carbs in different foods. ? Bread, cereal, pasta, and rice have about 15 grams of carbs in a serving.  A serving is 1 slice of bread (1 ounce), ½ cup of cooked cereal, or 1/3 cup of cooked pasta or rice. ? Fruits have 15 grams of carbs in a serving. A serving is 1 small fresh fruit, such as an apple or orange; ½ of a banana; ½ cup of cooked or canned fruit; ½ cup of fruit juice; 1 cup of melon or raspberries; or 2 tablespoons of dried fruit. ? Milk and no-sugar-added yogurt have 15 grams of carbs in a serving. A serving is 1 cup of milk or 2/3 cup of no-sugar-added yogurt. ? Starchy vegetables have 15 grams of carbs in a serving. A serving is ½ cup of mashed potatoes or sweet potato; 1 cup winter squash; ½ of a small baked potato; ½ cup of cooked beans; or ½ cup cooked corn or green peas. · Learn how much carbs to eat each day and at each meal. A dietitian or CDE can teach you how to keep track of the amount of carbs you eat. This is called carbohydrate counting. · If you are not sure how to count carbohydrate grams, use the Plate Method to plan meals. It is a good, quick way to make sure that you have a balanced meal. It also helps you spread carbs throughout the day. ? Divide your plate by types of foods. Put non-starchy vegetables on half the plate, meat or other protein food on one-quarter of the plate, and a grain or starchy vegetable in the final quarter of the plate. To this you can add a small piece of fruit and 1 cup of milk or yogurt, depending on how many carbs you are supposed to eat at a meal.  · Try to eat about the same amount of carbs at each meal. Do not \"save up\" your daily allowance of carbs to eat at one meal.  · Proteins have very little or no carbs per serving. Examples of proteins are beef, chicken, turkey, fish, eggs, tofu, cheese, cottage cheese, and peanut butter. A serving size of meat is 3 ounces, which is about the size of a deck of cards. Examples of meat substitute serving sizes (equal to 1 ounce of meat) are 1/4 cup of cottage cheese, 1 egg, 1 tablespoon of peanut butter, and ½ cup of tofu. How can you eat out and still eat healthy?   · Learn to estimate the serving sizes of foods that have carbohydrate. If you measure food at home, it will be easier to estimate the amount in a serving of restaurant food. · If the meal you order has too much carbohydrate (such as potatoes, corn, or baked beans), ask to have a low-carbohydrate food instead. Ask for a salad or green vegetables. · If you use insulin, check your blood sugar before and after eating out to help you plan how much to eat in the future. · If you eat more carbohydrate at a meal than you had planned, take a walk or do other exercise. This will help lower your blood sugar. What else should you know? · Limit saturated fat, such as the fat from meat and dairy products. This is a healthy choice because people who have diabetes are at higher risk of heart disease. So choose lean cuts of meat and nonfat or low-fat dairy products. Use olive or canola oil instead of butter or shortening when cooking. · Don't skip meals. Your blood sugar may drop too low if you skip meals and take insulin or certain medicines for diabetes. · Check with your doctor before you drink alcohol. Alcohol can cause your blood sugar to drop too low. Alcohol can also cause a bad reaction if you take certain diabetes medicines. Follow-up care is a key part of your treatment and safety. Be sure to make and go to all appointments, and call your doctor if you are having problems. It's also a good idea to know your test results and keep a list of the medicines you take. Where can you learn more? Go to http://rickey-mag.info/. Enter P663 in the search box to learn more about \"Learning About Diabetes Food Guidelines. \"  Current as of: December 7, 2017  Content Version: 11.8  © 8898-9243 [employer]. Care instructions adapted under license by [employer] (which disclaims liability or warranty for this information).  If you have questions about a medical condition or this instruction, always ask your healthcare professional. [place] [address] any warranty or liability for your use of this information. Learning About Insulin Pens  What is an insulin pen? An insulin pen is a device for giving insulin shots. It looks like a pen. Inside the pen is a needle and a cartridge filled with insulin. You can set the dose of insulin with a dial on the outside of the pen. You use the pen to give the insulin shot (injection). Both disposable and reusable insulin pens are available. With a disposable pen, a set amount of insulin comes in the pen ready to use. When the insulin is used up, you throw the pen away. You use a new pen the next time you need insulin. With a reusable pen, you don't throw the pen away. Instead, you reload the pen with a pre-measured cartridge of insulin. When the insulin is used up, you insert a new cartridge into the pen. Disposable and reusable pens both need a new needle with each shot. The needles come in different lengths and widths. [place] needles will prevent injecting into the muscle, especially in children or people who are lean. Thinner-width needles reduce the pricking sensation. Width is measured by gauge. The higher the number, the thinner the needle. Why do some people prefer pens? · Most people find that insulin pens are easier to use than a bottle and syringe. · Many people feel less pain (or no pain) with the smaller insulin pen needle, compared to a syringe needle. · Insulin pens may help you give yourself more accurate doses. When you draw insulin into a syringe, you must carefully measure so that you don't get too much or too little. But with a pen, you set a dial for the amount of insulin you want, and then you push the button. · Insulin pens may work better than syringes for people who don't see well or who have problems like arthritis that make it harder to use a syringe. · Using an insulin pen draws less attention from others.  You can give yourself insulin with fewer people noticing. · You don't need to carry insulin bottles and syringes everywhere you go. An insulin pen fits into a pocket or purse. What should you know about insulin pens? Each pen delivers a different brand and type (or types) of insulin. Some deliver rapid-acting insulin. Others deliver long-acting insulin. And some pens deliver a mixture of both in one shot. Pens have different colored labels, cartridge holders, or dosing knobs. Many pens have special features. For example, some pens have springs so that it takes less force to deliver a dose of insulin. Other pens have signals you can hear that let you know the insulin has been delivered. Some have memory to show the amount and time of the last dose. How do you use an insulin pen? 1. For a reusable pen, put the insulin cartridge into the pen. Disposable pens already have an insulin cartridge. Follow the directions for how to screw a new needle onto your pen. 2. Remove the outer cap from the needle. Keep this cap to use later. 3. Remove the inner cover from the needle. Be careful not to prick yourself. 4. Before each shot, prime the needle. Priming removes air from the needle. Turn the dose knob to 2 units. Hold your pen with the needle pointing up. Tap the cartridge seth gently to move any air bubbles to the top. Push the injection button all the way in. Watch for a stream or drop of insulin to come out of the needle. If it does not, repeat this step again. 5. Clean the area of skin where you will give the shot. If you use alcohol to clean the skin, let it dry. Use a different spot each time you inject insulin. That's because using the same spot every time can cause bumps or pits to form in the skin. For example, inject your insulin above your belly button, then the next time use your upper thigh, and then the next time inject below your belly button.   6. Turn the dose knob to the number of units of insulin you need to inject. Push the needle into your skin. Most people can inject using a 90-degree angle and without pinching the skin. Adults and children who are very lean and people who use longer needles may need to pinch the skin to avoid injecting into muscle. 7. Put your thumb on the injection button and push it in until it stops. Keep the pen in your skin. Hold the dose knob in for 10 seconds (or to the number that the  recommends). Then pull the needle out of your skin. Do not rub the area. 8. Put only the outer cap back over the needle. The thin inner cover is harder to put back on, and you could stick yourself. 9. After covering the needle with the outer cap, unscrew the needle and throw it away in a sharps container or other solid plastic container. You can get a sharps container at your drugstore. 10. Always read the insulin package information that tells the best way to store your insulin pen and insulin cartridges. In general, unopened insulin for pens will last longer if it is kept in the refrigerator. After insulin is opened, most manufacturers say to store it at room temperature. Don't share insulin pens with anyone else who uses insulin. Even when the needle is changed, an insulin pen can carry bacteria or blood that can make another person sick. Where can you learn more? Go to http://rickey-mag.info/. Enter M910 in the search box to learn more about \"Learning About Insulin Pens. \"  Current as of: December 7, 2017  Content Version: 11.8  © 9079-4091 [employer]. Care instructions adapted under license by [employer] (which disclaims liability or warranty for this information). If you have questions about a medical condition or this instruction, always ask your healthcare professional. [place] [address] any warranty or liability for your use of this information.            Type 1 Diabetes: Care Instructions  Your Care Instructions    Type 1 diabetes is a lifelong disease that develops when the pancreas stops making insulin. The body needs insulin to let sugar (glucose) move from the blood into the body's cells, where it can be used for energy or stored for later use. Without insulin, the sugar cannot get into the cells to do its work. It stays in the blood instead. This can cause high blood sugar levels. A person has diabetes when the blood sugar is too high. Over time, diabetes can lead to diseases of the heart, blood vessels, nerves, kidneys, and eyes. To treat type 1 diabetes, you need insulin. You can give yourself insulin through an insulin pump, an insulin pen, or a syringe (needle). Insulin, exercise, and a healthy diet can help prevent or delay problems from diabetes. With education and support, you will treat diabetes as a part of your life--not your whole life. Seek support when you need it from your family, friends, and your doctor or other diabetes experts. Follow-up care is a key part of your treatment and safety. Be sure to make and go to all appointments, and call your doctor if you are having problems. It's also a good idea to know your test results and keep a list of the medicines you take. How can you care for yourself at home? · Take your insulin on time and in the right dose. This helps keep your blood sugar steady. Do not stop or change your insulin without talking to your doctor first.  · Check and record your blood sugar as often as directed. These records can help your doctor see how you are doing and adjust your treatment if needed. It is important to keep track of any symptoms you have, such as low blood sugar, and any changes in your activities, diet, or insulin use. · Eat a good diet that spreads carbohydrate throughout the day. Carbohydrate--the body's main source of fuel--affects blood sugar more than any other nutrient. Carbohydrate is in fruits, vegetables, milk, and yogurt.  It also is in breads, cereals, vegetables such as potatoes and corn, and sugary foods such as candy and cakes. · Aim for 30 minutes of exercise on most, preferably all, days of the week. Walking is a good choice. You also may want to do other activities, such as running, swimming, cycling, or playing tennis or team sports. If your doctor says it's okay, do muscle-strengthening exercises at least 2 times a week. · Control your cholesterol, and try to keep your blood pressure at 140/90 or below. Exercise and healthy eating can help with these goals. If you have medicine for cholesterol or high blood pressure, take it as directed. Do not stop or change a medicine without talking to your doctor first.  · If you have discussed it with your doctor, take a low-dose aspirin every day to help prevent heart attack and stroke. Do not start taking aspirin unless your doctor knows about it. · Do not smoke. If you need help quitting, talk to your doctor about stop-smoking programs and medicines. These can increase your chances of quitting for good. · Check your feet daily for blisters, cracks, and sores. Have your doctor look at your feet whenever you have a checkup. · Get a checkup every 3 to 6 months. Your doctor will tell you how often to come in. You will need regular tests such as:  ? A hemoglobin A1c test. You may need this test more often than once a year. It is a good measure of how well your treatment is working. ? A cholesterol test.  ? A urine test for protein. This checks for kidney problems. ? A complete foot exam.  ? An eye exam, even if you do not think your vision has changed. When should you call for help? Call 911 anytime you think you may need emergency care. For example, call if:    · You passed out (lost consciousness), or you suddenly become very sleepy or confused. (You may have very low blood sugar.)     · You have symptoms of high blood sugar, such as:  ? Blurred vision. ?  Trouble staying awake or being woken up.  ? Fast, deep breathing. ? Breath that smells fruity. ? Belly pain, not feeling hungry, and vomiting. ? Feeling confused.    Call your doctor now or seek immediate medical care if:    · Your blood sugar stays higher than the level your doctor has set for you.     · You have symptoms of low blood sugar, such as:  ? Sweating. ? Feeling nervous, shaky, and weak. ? Extreme hunger and slight nausea. ? Dizziness and headache.  ? Blurred vision. ? Confusion.    Watch closely for changes in your health, and be sure to contact your doctor if:    · You often have problems controlling your blood sugar.     · You have symptoms of long-term diabetes problems, such as:  ? New vision changes. ? New pain, numbness, or tingling in your hands or feet. ? Skin problems. Where can you learn more? Go to http://rickey-mag.info/. Enter P859 in the search box to learn more about \"Type 1 Diabetes: Care Instructions. \"  Current as of: December 7, 2017  Content Version: 11.8  © 7444-2300 [employer]. Care instructions adapted under license by [employer] (which disclaims liability or warranty for this information). If you have questions about a medical condition or this instruction, always ask your healthcare professional. [place] [address] any warranty or liability for your use of this information. DISCHARGE SUMMARY from Nurse    PATIENT INSTRUCTIONS:    After general anesthesia or intravenous sedation, for 24 hours or while taking prescription Narcotics:  · Limit your activities  · Do not drive and operate hazardous machinery  · Do not make important personal or business decisions  · Do  not drink alcoholic beverages  · If you have not urinated within 8 hours after discharge, please contact your surgeon on call.     Report the following to your surgeon:  · Excessive pain, swelling, redness or odor of or around the surgical area  · Temperature over 100.5  · Nausea and vomiting lasting longer than 4 hours or if unable to take medications  · Any signs of decreased circulation or nerve impairment to extremity: change in color, persistent  numbness, tingling, coldness or increase pain  · Any questions    What to do at Home:  Recommended activity: Activity as tolerated,     If you experience any of the following symptoms fever greater then 100.5, pain unrelieved by medication, increase in shortness of breath, please follow up with primary care doctor. *  Please give a list of your current medications to your Primary Care Provider. *  Please update this list whenever your medications are discontinued, doses are      changed, or new medications (including over-the-counter products) are added. *  Please carry medication information at all times in case of emergency situations. These are general instructions for a healthy lifestyle:    No smoking/ No tobacco products/ Avoid exposure to second hand smoke  Surgeon General's Warning:  Quitting smoking now greatly reduces serious risk to your health. Obesity, smoking, and sedentary lifestyle greatly increases your risk for illness    A healthy diet, regular physical exercise & weight monitoring are important for maintaining a healthy lifestyle    You may be retaining fluid if you have a history of heart failure or if you experience any of the following symptoms:  Weight gain of 3 pounds or more overnight or 5 pounds in a week, increased swelling in our hands or feet or shortness of breath while lying flat in bed. Please call your doctor as soon as you notice any of these symptoms; do not wait until your next office visit. Recognize signs and symptoms of STROKE:    F-face looks uneven    A-arms unable to move or move unevenly    S-speech slurred or non-existent    T-time-call 911 as soon as signs and symptoms begin-DO NOT go       Back to bed or wait to see if you get better-TIME IS BRAIN.     Warning Signs of HEART ATTACK     Call 911 if you have these symptoms:   Chest discomfort. Most heart attacks involve discomfort in the center of the chest that lasts more than a few minutes, or that goes away and comes back. It can feel like uncomfortable pressure, squeezing, fullness, or pain.  Discomfort in other areas of the upper body. Symptoms can include pain or discomfort in one or both arms, the back, neck, jaw, or stomach.  Shortness of breath with or without chest discomfort.  Other signs may include breaking out in a cold sweat, nausea, or lightheadedness. Don't wait more than five minutes to call 911 - MINUTES MATTER! Fast action can save your life. Calling 911 is almost always the fastest way to get lifesaving treatment. Emergency Medical Services staff can begin treatment when they arrive -- up to an hour sooner than if someone gets to the hospital by car. The discharge information has been reviewed with the patient. The patient verbalized understanding. Discharge medications reviewed with the patient and appropriate educational materials and side effects teaching were provided.   ___________________________________________________________________________________________________________________________________

## 2018-11-12 NOTE — PROGRESS NOTES
[facility] NURSE PROGRESS REPORT SUBJECTIVE: Called to assess patient secondary to transfer from ICU. MEWS Score: 3 (11/11/18 1515) Vitals:  
 11/11/18 0330 11/11/18 0743 11/11/18 1136 11/11/18 1515 BP: 91/61 93/61 92/60 99/70 Pulse: 77 86 94 (!) 102 Resp: 18 18 18 18 Temp: 97.8 °F (36.6 °C) 98.1 °F (36.7 °C) 98.4 °F (36.9 °C) 98.6 °F (37 °C) SpO2: 98% 99% 97% 97% Weight:      
Height:      
  
 
LAB DATA: 
 
Recent Labs 11/11/18 
0427 11/10/18 733 162 319 11/10/18 
0347 11/09/18 
1943 11/09/18 
1543  11/09/18 
0810  11/08/18 
2031  134* 138 140 141   < > 138   < > 144 K 3.0* 3.6 4.0 3.1* 3.6   < > 3.1*   < > 4.3  104 109* 113* 114*   < > 110*   < > 119* CO2 27 19* 15* 16* 13*   < > 5*   < > 4* AGAP 8 11 14 11 14   < > 23*   < > 21*  307* 167* 176* 145*   < > 209*   < > 185* BUN 6 2* 2* 3* 3*   < > 4*   < > 9  
CREA 0.34* 0.58* 0.46* 0.57* 0.65   < > 0.70   < > 0.62 GFRAA >60 >60 >60 >60 >60   < > >60   < > >60 GFRNA >60 >60 >60 >60 >60   < > >60   < > >60  
CA 8.7 8.5 8.3 7.6* 7.7*   < > 7.8*   < > 7.0*  
MG  --   --  1.9 1.7* 1.8   < > 1.6*   < > 1.6* PHOS  --   --   --   --   --   --  1.7*  --  1.4*  
 < > = values in this interval not displayed. Recent Labs 11/11/18 
[id number] 11/10/18 
0347 11/09/18 
7228 WBC 2.7* 3.0* 6.5 HGB 12.4 11.4* 12.6 HCT 34.0* 31.5* 36.0 * 122* 146* OBJECTIVE: On arrival to room, I found patient to be in bed with family and visitors. Pain Assessment Pain Intensity 1: 0 (11/11/18 1606) Patient Stated Pain Goal: 0 
 
  
ASSESSMENT:  Patient appears in no distress, , O2 sat 99% on room air. Denies any needs at this time. PLAN:  Continue to follow per outreach protocol.

## 2018-11-12 NOTE — DISCHARGE SUMMARY
Hospitalist Discharge Summary     Admit Date:  2018  4:19 PM   Name:  Faizan Das   Age:  20 y.o.  :  1995   MRN:  [id number]   PCP:  Ramu Cruz MD  Treatment Team: Attending Provider: Lele Sanchez MD; Consulting Provider: Cecilia Kaur MD; Care Manager: Edna Patel RN; Utilization Review: Jeffy Mclaughlin RN; Hospitalist: Kirsty Godfrey MD    Problem List for this Hospitalization:  Hospital Problems as of 2018 Date Reviewed: 2018          Codes Class Noted - Resolved POA    New onset type 1 diabetes mellitus, uncontrolled ([facility] ICD-10-CM: E10.65  ICD-9-CM: 250.03  2018 - Present Yes        Hypokalemia ICD-10-CM: E87.6  ICD-9-CM: 276.8  2018 - Present Yes        * (Principal) RESOLVED: DKA (diabetic ketoacidoses) ([facility].) ICD-10-CM: E13.10  ICD-9-CM: 250.10  2018 - 2018 Yes        RESOLVED: Abscess of right breast ICD-10-CM: N61.1  ICD-9-CM: 611.0  2018 - 2018 Yes                Admission HPI from 2018:    HPI:   20years old F with PMH of anxiety presented to the hospital complaining of nausea and vomiting started this morning. Patient stated having associated epigastric pain worse with vomiting. Patient reported feeling very thirsty and having polyuria for several weeks. Patient denies history of DM or family history of DM. Patient went to urgent care where she was found to have elevated blood sugar above 400. Patient reported had I&D of R breast abscess twice, she is on day 1 of keflex. Patient also denies cough, sick contacts at home, diarrhea, dysuria. In the ED labs revealed blood glucose above 400 with AG 23. Respiratory therapist reported a Vanous Ph of 6.88      Hospital Course:  19 y/o female admitted to ICU on  with DKA. A1C 10. Treated with insulin drip and IV fluids. Insulin drip was weaned and she was started on basal/bolus insulin regimen.  Appetite improved and patient met with diabetic educator and dietician. She feels better and denies any N/V. Current regimen is Lantus 15U qhs, Humalog 5U TID meals and sliding scale. She will have f/u with her PCP this week and call [facility] Endocrinology to establish care (previous hospitalist spoke with Dr. Sunny Shannon). She has already been given glucometer, test strips and lancets. Follow up instructions and discharge meds at bottom of this note. Plan was discussed with patient, , nursing. All questions answered. Patient was stable at time of discharge. INITIATE INSULIN CORRECTIVE PROTOCOL:   Normal Insulin Sensitivity    For Blood Sugar (mg/dL) of:      Less than 150 =   0 units            150 -199 =   2 units   200 -249 =   4 units   250 -299 =   6 units   300 -349 =   8 units   350 and above = 10 units      Diagnostic Imaging/Tests:   Xr Chest Sngl V    Result Date: 11/8/2018  Portable Chest  X-ray  Indication: Leukocytosis Comparison:  None Findings:  Portable AP view demonstrates lungs well expanding clear. Costophrenic angles are marginated. Cardiac shadow and aortic shadow within normal limits. Bony thorax grossly intact. Impression:  Negative portable chest x-ray       Echocardiogram results:  No results found for this visit on 11/08/18. All Micro Results     None          Labs: Results:       BMP, Mg, Phos Recent Labs     11/12/18  0414 11/11/18  0427 11/10/18  1035 11/10/18  0347 11/09/18  1943 11/09/18  1543  11/09/18  0810    141 134* 138 140 141   < > 138   K 3.1* 3.0* 3.6 4.0 3.1* 3.6   < > 3.1*    106 104 109* 113* 114*   < > 110*   CO2 30 27 19* 15* 16* 13*   < > 5*   AGAP 8 8 11 14 11 14   < > 23*   BUN 6 6 2* 2* 3* 3*   < > 4*   CREA 0.40* 0.34* 0.58* 0.46* 0.57* 0.65   < > 0.70   CA 8.7 8.7 8.5 8.3 7.6* 7.7*   < > 7.8*   GLU 93 100 307* 167* 176* 145*   < > 209*   MG  --   --   --  1.9 1.7* 1.8   < > 1.6*   PHOS  --   --   --   --   --   --   --  1.7*    < > = values in this interval not displayed. CBC Recent Labs     11/11/18  0427 11/10/18  0347 11/09/18  0810   WBC 2.7* 3.0* 6.5   RBC 3.56* 3.24* 3.64*   HGB 12.4 11.4* 12.6   HCT 34.0* 31.5* 36.0   * 122* 146*   GRANS 37* 49 78   LYMPH 48* 38 13   EOS 1 0* 0*   MONOS 14* 13* 8   BASOS 0 0 0   IG 0 0 0   ANEU 1.0* 1.5* 5.1   ABL 1.3 1.1 0.9   JAVIER 0.0 0.0 0.0   ABM 0.4 0.4 0.5   ABB 0.0 0.0 0.0   AIG 0.0 0.0 0.0      LFT No results for input(s): SGOT, ALT, TBIL, AP, TP, ALB, GLOB, AGRAT, GPT in the last 72 hours. Cardiac Testing No results found for: BNPP, BNP, CPK, RCK1, RCK2, RCK3, RCK4, CKMB, CKNDX, CKND1, TROPT, TROIQ   Coagulation Tests No results found for: PTP, INR, APTT   A1c Lab Results   Component Value Date/Time    Hemoglobin A1c 9.6 (H) 11/10/2018 10:35 AM    Hemoglobin A1c 10.0 (H) 11/08/2018 08:31 PM      Lipid Panel No results found for: CHOL, CHOLPOCT, CHOLX, CHLST, CHOLV, 246263, HDL, LDL, LDLC, DLDLP, 769389, VLDLC, VLDL, TGLX, TRIGL, TRIGP, TGLPOCT, CHHD, CHHDX   Thyroid Panel Lab Results   Component Value Date/Time    TSH 1.600 11/13/2017 03:51 PM        Most Recent UA No results found for: COLOR, APPRN, REFSG, IGNACIO, PROTU, GLUCU, KETU, BILU, BLDU, UROU, CARSON, LEUKU, WBCU, RBCU, UEPI, BACTU, CASTS, UCRY, MUCUS, UCOM     No Known Allergies    There is no immunization history on file for this patient.     All Labs from Last 24 Hrs:  Recent Results (from the past 24 hour(s))   GLUCOSE, POC    Collection Time: 11/11/18 11:39 AM   Result Value Ref Range    Glucose (POC) 294 (H) 65 - 100 mg/dL   GLUCOSE, POC    Collection Time: 11/11/18  3:08 PM   Result Value Ref Range    Glucose (POC) 195 (H) 65 - 100 mg/dL   GLUCOSE, POC    Collection Time: 11/11/18  4:17 PM   Result Value Ref Range    Glucose (POC) 151 (H) 65 - 100 mg/dL   GLUCOSE, POC    Collection Time: 11/11/18  9:34 PM   Result Value Ref Range    Glucose (POC) 320 (H) 65 - 761 mg/dL   METABOLIC PANEL, BASIC    Collection Time: 11/12/18  4:14 AM   Result Value Ref Range    Sodium 145 136 - 145 mmol/L    Potassium 3.1 (L) 3.5 - 5.1 mmol/L    Chloride 107 98 - 107 mmol/L    CO2 30 21 - 32 mmol/L    Anion gap 8 7 - 16 mmol/L    Glucose 93 65 - 100 mg/dL    BUN 6 6 - 23 MG/DL    Creatinine 0.40 (L) 0.6 - 1.0 MG/DL    GFR est AA >60 >60 ml/min/1.73m2    GFR est non-AA >60 >60 ml/min/1.73m2    Calcium 8.7 8.3 - 10.4 MG/DL   GLUCOSE, POC    Collection Time: 11/12/18  6:02 AM   Result Value Ref Range    Glucose (POC) 98 65 - 100 mg/dL       Current Med List in Hospital:   Current Facility-Administered Medications   Medication Dose Route Frequency    potassium chloride (K-DUR, KLOR-CON) SR tablet 40 mEq  40 mEq Oral DAILY    insulin lispro (HUMALOG) injection 5 Units  5 Units SubCUTAneous TIDAC    lip protectant (BLISTEX) ointment   Topical PRN    escitalopram oxalate (LEXAPRO) tablet 20 mg  20 mg Oral QPM    insulin glargine (LANTUS) injection 15 Units  15 Units SubCUTAneous QHS    insulin lispro (HUMALOG) injection   SubCUTAneous AC&HS    promethazine (PHENERGAN) with saline injection 12.5 mg  12.5 mg IntraVENous Q6H PRN    magnesium oxide (MAG-OX) tablet 400 mg  400 mg Oral BID    phenol throat spray (CHLORASEPTIC) 1 Spray  1 Spray Oral PRN    dextrose 40% (GLUTOSE) oral gel 1 Tube  15 g Oral PRN    glucagon (GLUCAGEN) injection 1 mg  1 mg IntraMUSCular PRN    cephALEXin (KEFLEX) capsule 500 mg  500 mg Oral Q6H    ondansetron (ZOFRAN) injection 4 mg  4 mg IntraVENous Q4H PRN       Discharge Exam:  Patient Vitals for the past 24 hrs:   Temp Pulse Resp BP SpO2   11/12/18 0715 97.9 °F (36.6 °C) 89 18 93/60 98 %   11/12/18 0300 98.1 °F (36.7 °C) 82 18 (!) 87/56 98 %   11/11/18 2300 97.9 °F (36.6 °C) 82 18 95/64 97 %   11/11/18 1930 98.2 °F (36.8 °C) 83 18 102/69 100 %   11/11/18 1515 98.6 °F (37 °C) (!) 102 18 99/70 97 %   11/11/18 1136 98.4 °F (36.9 °C) 94 18 92/60 97 %     Oxygen Therapy  O2 Sat (%): 98 % (11/12/18 0715)  Pulse via Oximetry: 92 beats per minute (11/10/18 1201)  O2 Device: Room air (11/10/18 0800)    Intake/Output Summary (Last 24 hours) at 11/12/2018 0759  Last data filed at 11/12/2018 0300  Gross per 24 hour   Intake    Output 2600 ml   Net -2600 ml       *Note that automatically entered I/Os may not be accurate; dependent on patient compliance with collection and accurate  by assistants. General:    Well nourished. Alert. Eyes:   Normal sclera. Extraocular movements intact. ENT:  Normocephalic, atraumatic. Moist mucous membranes  CV:   Regular rate and rhythm. No murmur, rub, or gallop. Lungs:  Clear to auscultation bilaterally. No wheezing, rhonchi, or rales. Abdomen: Soft, nontender, nondistended. Bowel sounds normal.   Extremities: Warm and dry. No cyanosis or edema. Neurologic: CN II-XII grossly intact. Sensation intact. Skin:     No rashes or jaundice. Psych:  Normal mood and affect. Discharge Info:   Current Discharge Medication List      START taking these medications    Details   potassium chloride (K-DUR, KLOR-CON) 20 mEq tablet Take 2 Tabs by mouth daily. Qty: 14 Tab, Refills: 0      insulin glargine (LANTUS,BASAGLAR) 100 unit/mL (3 mL) inpn 15 Units by SubCUTAneous route nightly. Qty: 1 Pen, Refills: 1    Associated Diagnoses: New onset type 1 diabetes mellitus, uncontrolled (HCC)      insulin lispro (HUMALOG) 100 unit/mL kwikpen 5 Units by SubCUTAneous route Before breakfast, lunch, and dinner. Qty: 1 Package, Refills: 1    Associated Diagnoses: New onset type 1 diabetes mellitus, uncontrolled (HCC)      Insulin Needles, Disposable, 31 gauge x 5/16\" ndle by SubCUTAneous route three (3) times daily (with meals). Qty: 2 Package, Refills: 2    Comments: Has long and short acting insulin pens, disp 2 boxes 100 ct please         CONTINUE these medications which have NOT CHANGED    Details   escitalopram oxalate (LEXAPRO) 20 mg tablet Take 1 Tab by mouth daily.   Qty: 30 Tab, Refills: 5    Associated Diagnoses: Anxiety LORazepam (ATIVAN) 0.5 mg tablet Take 1 Tab by mouth every eight (8) hours as needed for Anxiety. Max Daily Amount: 1.5 mg.  Qty: 20 Tab, Refills: 0    Associated Diagnoses: Anxiety               Disposition: home    Activity: Activity as tolerated  Diet: DIET NUTRITIONAL SUPPLEMENTS All Meals; Glucerna  DIET NUTRITIONAL SUPPLEMENTS All Meals; Glucerna  DIET DIABETIC WITH OPTIONS Consistent Carb 1800kcal; Regular    Follow-up Appointments   Procedures    FOLLOW UP VISIT Appointment in: Other (Specify) PCP -- within 1 week Endocrine -- call to schedule     PCP -- within 1 week  Endocrine -- call to schedule     Standing Status:   Standing     Number of Occurrences:   1     Order Specific Question:   Appointment in     Answer: Other (Specify)         Follow-up Information     Follow up With Specialties Details Why Contact Info    Jacinta Rojas MD Family Practice In 1 week Hospital follow up -- DKA, new onset type 1 diabetes [address] [facility] Endocrinology Schedule an appointment as soon as possible for a visit Initial visit with provider. Hospital f/u for DKA, new onset type 1 DM. [address] [place] [address] [id number] [phone]          Time spent in patient discharge planning and coordination 35 minutes.     Signed:  Colleen Arriaga MD

## 2018-11-12 NOTE — PROGRESS NOTES
Date of Outreach Update: 
[facility] Previous Outreach assessment has been reviewed. There have been no significant clinical changes since the completion of the last dated Outreach assessment. Will continue to follow up per outreach protocol. Signed By:   Lala Favre   November 12, 2018 12:16 AM

## 2018-11-12 NOTE — PROGRESS NOTES
END OF SHIFT NOTE: 
 
INTAKE/OUTPUT 
11/11 0701 - 11/12 0700 In: -  
Out: 2600 [Urine:2600] Voiding: YES Catheter: NO 
Drain:   
 
 
 
 
 
Flatus: Patient does have flatus present. Stool:  0 occurrences. Characteristics: 
  
 
Emesis: 0 occurrences. Characteristics: VITAL SIGNS Patient Vitals for the past 12 hrs: 
 Temp Pulse Resp BP SpO2  
11/12/18 0300 98.1 °F (36.7 °C) 82 18 (!) 87/56 98 % 11/11/18 2300 97.9 °F (36.6 °C) 82 18 95/64 97 % 11/11/18 1930 98.2 °F (36.8 °C) 83 18 102/69 100 % Pain Assessment Pain Intensity 1: 0 (11/11/18 1606) Patient Stated Pain Goal: 0 Ambulating Yes Injected insulin by herself with good technique Shift report given to oncoming nurse at the bedside.  
 
Farzana Noel RN

## 2018-11-12 NOTE — PROGRESS NOTES
Gave discharge instructions to the pt. The pt and spouse voice a clear understanding. Iv removed x 1, and the primary nurse was made aware.

## 2018-11-29 PROBLEM — E87.6 HYPOKALEMIA: Status: RESOLVED | Noted: 2018-11-11 | Resolved: 2018-11-29

## 2019-02-04 ENCOUNTER — HOSPITAL ENCOUNTER (OUTPATIENT)
Dept: DIABETES SERVICES | Age: 24
Discharge: HOME | End: 2019-02-04
Attending: PHYSICIAN ASSISTANT
Payer: SELF-PAY

## 2019-02-04 PROCEDURE — G0108 DIAB MANAGE TRN  PER INDIV: HCPCS

## 2019-02-04 NOTE — PROGRESS NOTES
Came for diabetes educational assessment today. Provided basic information on carbohydrates, proteins and fats. Educational need/plan: Will attend individual sessions to address the following: diabetes disease process, nutritional management, carbohydrate counting,  physical activity, using medications, preventing complications, pychosocial adjustment, goal setting, problem solving, monitoring, behavior change strategies. Newly diagnosed with type 1 diabetes. Wearing DexCom 6 continuous glucose monitoring system.

## 2019-02-05 ENCOUNTER — HOSPITAL ENCOUNTER (OUTPATIENT)
Dept: DIABETES SERVICES | Age: 24
Discharge: HOME | End: 2019-02-05
Attending: PHYSICIAN ASSISTANT
Payer: SELF-PAY

## 2019-02-05 PROCEDURE — G0108 DIAB MANAGE TRN  PER INDIV: HCPCS

## 2019-02-05 NOTE — PROGRESS NOTES
Attended diabetes nutrition class for persons with type one diabetes. Topics included disease process and treatment, intense carbohydrate counting including high fiber foods, foods with sugar alcohols and high protein meals, free foods, combination food choices, snack ideas  resources for diabetes management, fiber and sodium guidelines; sugar substitutes; alcohol; eating out; recipe modification and label reading. Emphasis placed on heart healthy protein choices and unsaturated fat choices. Practice with intense carbohydrate counting and practice with insulin to carbohydrate ratios was completed. Pt voiced/demonstrated understanding of material covered. Anticipated adherence is good. Spouse came with pt and is very supportive. Pt's nutrition goal: To lower A1C and prevent complications, she will diligently count carbs every time she eats everywhere and re-evaluate in 3 months. Pt's support plan: use the resources provided and websites. Problems/barriers may be: school lunches have limited choices. Plan for follow up is attend diabetes 1 session on 2/11/19.

## 2019-02-11 ENCOUNTER — HOSPITAL ENCOUNTER (OUTPATIENT)
Dept: DIABETES SERVICES | Age: 24
Discharge: HOME | End: 2019-02-11
Attending: PHYSICIAN ASSISTANT
Payer: SELF-PAY

## 2019-02-11 PROCEDURE — G0108 DIAB MANAGE TRN  PER INDIV: HCPCS

## 2019-02-26 PROBLEM — E10.9 CONTROLLED DIABETES MELLITUS TYPE 1 WITHOUT COMPLICATIONS (HCC): Status: ACTIVE | Noted: 2018-11-12

## 2019-02-26 PROBLEM — E78.00 PURE HYPERCHOLESTEROLEMIA: Status: ACTIVE | Noted: 2019-02-26

## 2019-05-06 PROBLEM — Z96.41 INSULIN PUMP IN PLACE: Status: ACTIVE | Noted: 2019-05-06

## 2020-07-01 PROBLEM — E78.2 MIXED HYPERLIPIDEMIA: Status: ACTIVE | Noted: 2020-07-01

## 2020-10-12 PROBLEM — E10.65 TYPE 1 DIABETES MELLITUS WITH HYPERGLYCEMIA (HCC): Status: ACTIVE | Noted: 2020-10-12

## 2021-02-07 ENCOUNTER — HOSPITAL ENCOUNTER (EMERGENCY)
Age: 26
Discharge: HOME | End: 2021-02-07
Attending: EMERGENCY MEDICINE
Payer: COMMERCIAL

## 2021-02-07 VITALS
RESPIRATION RATE: 20 BRPM | HEART RATE: 102 BPM | TEMPERATURE: 98.6 F | DIASTOLIC BLOOD PRESSURE: 70 MMHG | WEIGHT: 116 LBS | SYSTOLIC BLOOD PRESSURE: 115 MMHG | OXYGEN SATURATION: 99 % | HEIGHT: 62 IN | BODY MASS INDEX: 21.35 KG/M2

## 2021-02-07 DIAGNOSIS — L03.116 CELLULITIS OF LEFT LOWER EXTREMITY: Primary | ICD-10-CM

## 2021-02-07 DIAGNOSIS — J02.9 PHARYNGITIS, UNSPECIFIED ETIOLOGY: ICD-10-CM

## 2021-02-07 DIAGNOSIS — F41.9 ANXIETY: ICD-10-CM

## 2021-02-07 LAB
ALBUMIN SERPL-MCNC: 3.7 G/DL (ref 3.5–5)
ALBUMIN/GLOB SERPL: 1 {RATIO} (ref 1.2–3.5)
ALP SERPL-CCNC: 72 U/L (ref 50–130)
ALT SERPL-CCNC: 17 U/L (ref 12–65)
ANION GAP SERPL CALC-SCNC: 4 MMOL/L (ref 7–16)
AST SERPL-CCNC: 14 U/L (ref 15–37)
BASOPHILS # BLD: 0 K/UL (ref 0–0.2)
BASOPHILS NFR BLD: 0 % (ref 0–2)
BILIRUB SERPL-MCNC: 0.4 MG/DL (ref 0.2–1.1)
BUN SERPL-MCNC: 13 MG/DL (ref 6–23)
CALCIUM SERPL-MCNC: 9.3 MG/DL (ref 8.3–10.4)
CHLORIDE SERPL-SCNC: 105 MMOL/L (ref 98–107)
CO2 SERPL-SCNC: 29 MMOL/L (ref 21–32)
CREAT SERPL-MCNC: 0.76 MG/DL (ref 0.6–1)
DEPRECATED S PYO AG THROAT QL EIA: NEGATIVE
DIFFERENTIAL METHOD BLD: ABNORMAL
EOSINOPHIL # BLD: 0 K/UL (ref 0–0.8)
EOSINOPHIL NFR BLD: 1 % (ref 0.5–7.8)
ERYTHROCYTE [DISTWIDTH] IN BLOOD BY AUTOMATED COUNT: 13.2 % (ref 11.9–14.6)
GLOBULIN SER CALC-MCNC: 3.6 G/DL (ref 2.3–3.5)
GLUCOSE SERPL-MCNC: 294 MG/DL (ref 65–100)
HCT VFR BLD AUTO: 36.7 % (ref 35.8–46.3)
HGB BLD-MCNC: 12.3 G/DL (ref 11.7–15.4)
IMM GRANULOCYTES # BLD AUTO: 0 K/UL (ref 0–0.5)
IMM GRANULOCYTES NFR BLD AUTO: 0 % (ref 0–5)
LYMPHOCYTES # BLD: 0.8 K/UL (ref 0.5–4.6)
LYMPHOCYTES NFR BLD: 14 % (ref 13–44)
MCH RBC QN AUTO: 31.4 PG (ref 26.1–32.9)
MCHC RBC AUTO-ENTMCNC: 33.5 G/DL (ref 31.4–35)
MCV RBC AUTO: 93.6 FL (ref 79.6–97.8)
MONOCYTES # BLD: 0.4 K/UL (ref 0.1–1.3)
MONOCYTES NFR BLD: 7 % (ref 4–12)
NEUTS SEG # BLD: 4.4 K/UL (ref 1.7–8.2)
NEUTS SEG NFR BLD: 78 % (ref 43–78)
NRBC # BLD: 0 K/UL (ref 0–0.2)
PLATELET # BLD AUTO: 220 K/UL (ref 150–450)
PMV BLD AUTO: 10.8 FL (ref 9.4–12.3)
POTASSIUM SERPL-SCNC: 4 MMOL/L (ref 3.5–5.1)
PROT SERPL-MCNC: 7.3 G/DL (ref 6.3–8.2)
RBC # BLD AUTO: 3.92 M/UL (ref 4.05–5.2)
SODIUM SERPL-SCNC: 138 MMOL/L (ref 136–145)
WBC # BLD AUTO: 5.7 K/UL (ref 4.3–11.1)

## 2021-02-07 PROCEDURE — 80053 COMPREHEN METABOLIC PANEL: CPT

## 2021-02-07 PROCEDURE — 99282 EMERGENCY DEPT VISIT SF MDM: CPT

## 2021-02-07 PROCEDURE — 87081 CULTURE SCREEN ONLY: CPT

## 2021-02-07 PROCEDURE — 85025 COMPLETE CBC W/AUTO DIFF WBC: CPT

## 2021-02-07 PROCEDURE — 87880 STREP A ASSAY W/OPTIC: CPT

## 2021-02-07 RX ORDER — CLINDAMYCIN HYDROCHLORIDE 150 MG/1
300 CAPSULE ORAL 4 TIMES DAILY
Qty: 56 CAP | Refills: 0 | Status: SHIPPED | OUTPATIENT
Start: 2021-02-07 | End: 2021-02-14

## 2021-02-07 RX ORDER — BUPROPION HYDROCHLORIDE 150 MG/1
150 TABLET ORAL
Qty: 7 TAB | Refills: 3 | Status: SHIPPED | OUTPATIENT
Start: 2021-02-07 | End: 2021-03-30 | Stop reason: SDUPTHER

## 2021-02-07 NOTE — ED NOTES
I have reviewed discharge instructions with the patient. The patient verbalized understanding. Patient left ED via Discharge Method: ambulatory to Home with self. Opportunity for questions and clarification provided. Patient given 2 scripts. To continue your aftercare when you leave the hospital, you may receive an automated call from our care team to check in on how you are doing. This is a free service and part of our promise to provide the best care and service to meet your aftercare needs.  If you have questions, or wish to unsubscribe from this service please call [phone]. Thank you for Choosing our [facility] Emergency Department.

## 2021-02-07 NOTE — ED NOTES
Pt here with redness and swelling and hot to touch area to left thigh. Admits to not cleaning the area prior to placing her omnipod on Wednesday night. Pt type 1 DM. Mother presents. Both masked. Pt went to urgent care yesterday and was placed on augmentin.

## 2021-02-07 NOTE — DISCHARGE INSTRUCTIONS
Home with family   use meds as directed, stop augmentin. Follow with your family doctor, or if symptoms worsen return to ED.

## 2021-02-07 NOTE — Clinical Note
[id number] [address] EMERGENCY DEPT 
[id number] [facility] [id number] [phone] Work/School Note Date: 2/7/2021 To Whom It May concern: 
 
Anish Oliver was seen and treated today in the emergency room by the following provider(s): 
Attending Provider: Khushbu Baron MD 
Nurse Practitioner: Katerina Romo NP. Anish Oliver is excused from work/school on 02/07/21 and 02/08/21. She is medically clear to return to work/school on 2/9/2021. Sincerely, Job Whitehead NP

## 2021-02-07 NOTE — ED PROVIDER NOTES
29 y/o female to the emergency department with complaint of infection to her left upper thigh. She had placed her Omni pod as usual last week but over the last few days she has noticed moderate area of erythema and edema. She went to urgent care and they started her on Augmentin yesterday. She has taken 3 of the doses however it is getting worse. She today has developed an area of induration without fluctuance centrally. She also has developed a sore throat last night. No fever chills cough congestion difficulty with void or bowel movement.   She went to urgent care again today and they directed her here for further evaluation           Past Medical History:   Diagnosis Date    Scrofula 2003    Type 1 diabetes mellitus ([facility] [address]        Past Surgical History:   Procedure Laterality Date    HX LYMPHADENECTOMY Left 2003    cervical         Family History:   Problem Relation Age of Onset    No Known Problems Mother     No Known Problems Father     Thyroid Disease Maternal Grandmother         goiter, Hashimoto's thyroiditis    Cancer Maternal Grandfather         lung    Thyroid Disease Sister         hypothyroidism    Other Sister         autoimmune disorder    Diabetes Neg Hx        Social History     Socioeconomic History    Marital status:      Spouse name: Not on file    Number of children: Not on file    Years of education: Not on file    Highest education level: Not on file   Occupational History    Not on file   Social Needs    Financial resource strain: Not on file    Food insecurity     Worry: Not on file     Inability: Not on file    Transportation needs     Medical: Not on file     Non-medical: Not on file   Tobacco Use    Smoking status: Never Smoker    Smokeless tobacco: Never Used   Substance and Sexual Activity    Alcohol use: Yes     Comment: wine occasionally    Drug use: Never    Sexual activity: Not on file   Lifestyle    Physical activity     Days per week: Not on file Minutes per session: Not on file    Stress: Not on file   Relationships    Social connections     Talks on phone: Not on file     Gets together: Not on file     Attends Buddhism service: Not on file     Active member of club or organization: Not on file     Attends meetings of clubs or organizations: Not on file     Relationship status: Not on file    Intimate partner violence     Fear of current or ex partner: Not on file     Emotionally abused: Not on file     Physically abused: Not on file     Forced sexual activity: Not on file   Other Topics Concern    Not on file   Social History Narrative    Not on file         ALLERGIES: Patient has no known allergies. Review of Systems   Constitutional: Negative for chills and fever. HENT: Positive for sore throat. Negative for facial swelling. Eyes: Negative for photophobia and visual disturbance. Respiratory: Negative for cough and shortness of breath. Cardiovascular: Negative for chest pain and palpitations. Gastrointestinal: Negative for abdominal pain, diarrhea, nausea and vomiting. Endocrine: Negative for polydipsia and polyuria. Genitourinary: Negative for difficulty urinating and dysuria. Musculoskeletal: Negative for back pain and neck pain. Skin: Positive for color change and wound. Negative for rash. Neurological: Negative for dizziness and syncope. Psychiatric/Behavioral: Negative for confusion and decreased concentration. Vitals:    02/07/21 1130   BP: 115/70   Pulse: (!) 102   Resp: 20   Temp: 98.6 °F (37 °C)   SpO2: 99%   Weight: 52.6 kg (116 lb)   Height: 5' 2\" (1.575 m)            Physical Exam  Vitals signs and nursing note reviewed. Constitutional:       General: She is not in acute distress. Appearance: She is well-developed. HENT:      Head: Normocephalic and atraumatic. Right Ear: External ear normal.      Left Ear: External ear normal.      Nose: Nose normal.      Mouth/Throat:      Lips: Pink. Mouth: Mucous membranes are moist.      Pharynx: Posterior oropharyngeal erythema present. No oropharyngeal exudate. Eyes:      Conjunctiva/sclera: Conjunctivae normal.      Pupils: Pupils are equal, round, and reactive to light. Neck:      Musculoskeletal: Normal range of motion and neck supple. Cardiovascular:      Rate and Rhythm: Normal rate and regular rhythm. Heart sounds: Normal heart sounds. Pulmonary:      Effort: Pulmonary effort is normal. No respiratory distress. Breath sounds: Normal breath sounds. No wheezing. Musculoskeletal: Normal range of motion. General: Tenderness present. Legs:    Skin:     General: Skin is warm and dry. Findings: Erythema present. No rash. Neurological:      Mental Status: She is alert and oriented to person, place, and time. Cranial Nerves: No cranial nerve deficit. Coordination: Coordination normal.   Psychiatric:         Behavior: Behavior normal.         Thought Content: Thought content normal.         Judgment: Judgment normal.          MDM  Number of Diagnoses or Management Options  Diagnosis management comments: Infected OmniPod site as well as sore throat. Will evaluate rapid strep and discharged to home to return if symptoms worsen or do not improve.     1:41 PM  \  Rapid strep  Neg   Dc to home       Amount and/or Complexity of Data Reviewed  Clinical lab tests: ordered and reviewed    Risk of Complications, Morbidity, and/or Mortality  Presenting problems: minimal  Diagnostic procedures: minimal  Management options: minimal    Patient Progress  Patient progress: stable         Procedures

## 2021-02-09 LAB
BACTERIA SPEC CULT: NORMAL
SERVICE CMNT-IMP: NORMAL

## 2021-12-03 PROBLEM — F33.0 MAJOR DEPRESSIVE DISORDER, RECURRENT, MILD (HCC): Status: ACTIVE | Noted: 2021-12-03

## 2022-03-18 PROBLEM — E78.00 PURE HYPERCHOLESTEROLEMIA: Status: ACTIVE | Noted: 2019-02-26

## 2022-03-18 PROBLEM — F33.0 MAJOR DEPRESSIVE DISORDER, RECURRENT, MILD (HCC): Status: ACTIVE | Noted: 2021-12-03

## 2022-03-18 PROBLEM — E10.9 CONTROLLED DIABETES MELLITUS TYPE 1 WITHOUT COMPLICATIONS (HCC): Status: ACTIVE | Noted: 2018-11-12

## 2022-03-19 PROBLEM — E10.65 TYPE 1 DIABETES MELLITUS WITH HYPERGLYCEMIA (HCC): Status: ACTIVE | Noted: 2020-10-12

## 2022-03-19 PROBLEM — E78.2 MIXED HYPERLIPIDEMIA: Status: ACTIVE | Noted: 2020-07-01

## 2022-03-19 PROBLEM — Z96.41 INSULIN PUMP IN PLACE: Status: ACTIVE | Noted: 2019-05-06

## 2022-05-19 NOTE — PROGRESS NOTES
----- Message from JAYDEN Donovan sent at 5/19/2022 10:03 AM CDT -----  Normal pap. Negative HPV. Repeat in 5 years. Please notify patient.     Writer contacted patient and informed her of her lab results. No further questions or concerns.    Participant attended Diabetes #1 and #2 session today. Topics included: Characteristics/pathophysiology type 1/type 2 diabetes; Goal/acceptable blood glucose ranges/Hgb A1C/interpreting/using results;meters, continuous glucose monitors and insulin pumps. Using medications safely; Sick day management; Prevention/detection/treatment of acute complications. Prevention/detection/treatment of chronic complications; sleep apnea; Developing strategies to promote health/change behavior/recommended screenings; Developing strategies to address psychosocial issues; Goal setting.  - Verbalized understanding of material covered.  -Anticipated adherence is good   Participants goal/support plan includes Medical Goal:  To prevent complications: I will schedule an annual eye exam. I will call and schedule by 3-1-2019. I will continue annual exams and/or as ordered. Medical Plan:  I will set reminder in my phone to call and schedule annual eye appointment. Problems/barriers may be:none anticipated; comments:  Very motivated. Plan for follow up/Recommendations: mail follow up survey in 3 months.

## 2022-06-03 ENCOUNTER — OFFICE VISIT (OUTPATIENT)
Dept: ENDOCRINOLOGY | Age: 27
End: 2022-06-03
Payer: COMMERCIAL

## 2022-06-03 VITALS
OXYGEN SATURATION: 98 % | HEART RATE: 104 BPM | DIASTOLIC BLOOD PRESSURE: 70 MMHG | HEIGHT: 62 IN | WEIGHT: 122.4 LBS | BODY MASS INDEX: 22.52 KG/M2 | SYSTOLIC BLOOD PRESSURE: 116 MMHG

## 2022-06-03 DIAGNOSIS — E10.65 TYPE 1 DIABETES MELLITUS WITH HYPERGLYCEMIA (HCC): Primary | ICD-10-CM

## 2022-06-03 DIAGNOSIS — Z96.41 INSULIN PUMP IN PLACE: ICD-10-CM

## 2022-06-03 LAB — HBA1C MFR BLD: 6.7 %

## 2022-06-03 PROCEDURE — 99214 OFFICE O/P EST MOD 30 MIN: CPT | Performed by: PHYSICIAN ASSISTANT

## 2022-06-03 PROCEDURE — 83036 HEMOGLOBIN GLYCOSYLATED A1C: CPT | Performed by: PHYSICIAN ASSISTANT

## 2022-06-03 PROCEDURE — 95251 CONT GLUC MNTR ANALYSIS I&R: CPT | Performed by: PHYSICIAN ASSISTANT

## 2022-06-03 RX ORDER — METHYLPHENIDATE HYDROCHLORIDE 27 MG/1
TABLET ORAL
COMMUNITY

## 2022-06-03 RX ORDER — INSULIN LISPRO 100 [IU]/ML
INJECTION, SOLUTION INTRAVENOUS; SUBCUTANEOUS
COMMUNITY

## 2022-06-03 RX ORDER — BLOOD-GLUCOSE TRANSMITTER
EACH MISCELLANEOUS
COMMUNITY
Start: 2022-04-18

## 2022-06-03 RX ORDER — BLOOD-GLUCOSE SENSOR
EACH MISCELLANEOUS
COMMUNITY
Start: 2022-04-18

## 2022-06-03 RX ORDER — SUBCUTANEOUS INSULIN PUMP
EACH MISCELLANEOUS
Qty: 1 EACH | Refills: 0
Start: 2022-06-03

## 2022-06-03 RX ORDER — SUBCUTANEOUS INSULIN PUMP
EACH MISCELLANEOUS
COMMUNITY
Start: 2022-02-17 | End: 2022-06-03 | Stop reason: SDUPTHER

## 2022-06-03 NOTE — PROGRESS NOTES
[facility]    Luis Ferreira PA-C  [facility]  [address], [address]  [place], [address]  Phone [phone]          Bethanie Harrison is a 32 y.o. female seen 6/3/2022 for follow up evaluation of type 1 diabetes on insulin pump        Assessment and Plan:    In office COVID-19 PPE worn and precautions taken    Interpretation of 72 hour glucose monitor: At least 72 hours of data were reviewed. The patient utilizes a dexcom G6 continuous glucose monitoring system. The average glucose during the reviewed timeframe was 160 with a standard deviation of 52.6. There is a pattern of intermitant postprandial hyperglycemia after meals. 1. Type 1 diabetes mellitus with hyperglycemia (HCC)  Type 1 diabetes on insulin pump doing well overall. Some intermittent hyperglycemia. Patient admits that she is not regularly double checking her carb counting and guessing frequently. Best practices were reviewed including early bolusing, accurate carb counting, manual correction for hyperglycemia as needed, regular rotation of set sites. Of note, Patient referred to heme-onc by new PCP for easy bruising and reportedly abnormal ferritin level     Advised that diagnosis of one autoimmune disease increases risk of developing other autoimmune disease. Importance of self care, stress management, self monitoring was emphasized. - AMB POC HEMOGLOBIN A1C  - Continuous Blood Gluc Sensor (DEXCOM G6 SENSOR) MISC  - Continuous Blood Gluc Transmit (DEXCOM G6 TRANSMITTER) MISC  - HUMALOG 100 UNIT/ML SOLN injection vial; Inject into the skin  - NV CONTINUOUS GLUCOSE MONITORING ANALYSIS I&R    2. Insulin pump in place  Patient doing well on the tandem T slim X to insulin pump and hybrid closed-loop.   We will make slight adjustment to her carb ratio during the day to help streamline this to 4.0, Best practices reviewed including preprandial bolus and site rotation.  - Insulin Infusion Pump (T: SLIM X2 INS /CONTROL 7.4) MERVAT; Pump settings: standard pattern- 24 hour total 14.400 units Time MN  0.7, 5am  0.55, 1030 04, 3pm 0.30 8pm 0.6 Carb Ratio MN 5.0, 1030am 4.0, 9pm 5.5 Insulin Sensitivity MN 80 1030 pm 85 Target 110 Active Insulin time 5 Max Bolus 20 units  Dispense: 1 each; Refill: 0      Wendie Salcedo was seen today for diabetes. Diagnoses and all orders for this visit:    Type 1 diabetes mellitus with hyperglycemia (HCC)  -     AMB POC HEMOGLOBIN A1C  -     CA CONTINUOUS GLUCOSE MONITORING ANALYSIS I&R    Insulin pump in place  -     Insulin Infusion Pump (T: SLIM X2 INS /CONTROL 7.4) MERVAT; Pump settings: standard pattern- 24 hour total 14.400 units Time MN  0.7, 5am  0.55, 1030 04, 3pm 0.30 8pm 0.6 Carb Ratio MN 5.0, 1030am 4.0, 9pm 5.5 Insulin Sensitivity MN 80 1030 pm 85 Target 110 Active Insulin time 5 Max Bolus 20 units            History of Present Illness:    6/3/2022  Patient with type 1 diabetes returns to clinic for follow-up, [place]. Patient is doing well with her insulin pump. Admits to significant stress with life changes         History of Present Illness:      2/17/2022   Reports late afternoon low at 3pm. Prefers autosoft XC sets. The patient has a new phone and does not use her phone for a Dexcom fernandez. She also does not use her T connect fernandez as it seems to run down her battery because it is running in the background. We are unable to download patient's tandem T slim X to insulin  pump in office today due to technical issues including firewall issues             9/2/2021   Now on tandem system,.  Very happy with system               History of Present Illness:       2/22/2021   VIRTUAL VISIT   Tracey Fine a 30 y.o. female who was seen by synchronous (real-time) audio-video technology on 2/22/2021 . Brian Villanueva patient provided consent for this audio-video interaction.  The [employer] platform was used. Patient and provider  were located at their individual homes.        Patient returns for follow-up of type 1 diabetes on insulin pump.  Continues to use OmniPod Dash but has ordered tandem T slim X to. Nancy Eduardo is interested in pregnancy. Lawrence Diaz had problems with the-including site infection and multiple  pod failures leading to hyperglycemia           History of Present Illness:       DIABETES MELLITUS   Munira Campos here for follow up evaluation and treatment of type 1 diabetes mellitus.  This was diagnosed earlier this month during the admission to the hospital  for severe hyperglycemia.  She complained of polyuria and polydipsia in ~September 2018.  She was started on basal-bolus insulin therapy.       01/11/2019   Returns to clinic for close follow-up of new onset type 1 diabetes currently on basal bolus treatment regimen with Basaglar and Humalog experiencing frequent daytime hypoglycemia.  Currently  using Dex com with early warning.  Pt was not able to obtain fasting labs since last visit.       02/26/2019   Pt RTC for f/u of new onset type 1 diabetes on basal bolus regimen.  Did well on to receive a trial.  Was unable to get to receive a covered by insurance and started Elenita Ovens  again       09/26/2019   Patient returns clinic for follow-up of new onset type 1 diabetes on insulin pump therapy with CGM and an Omni pod. Nancy Eduardo is experiencing both hyper and hypoglycemia.  Patient  reports recent pod failures       01/03/2020   Patient with controlled type 1 diabetes on Omni pod insulin pump and CGM therapy.  Unfortunately, patient is on the [place] system and we have had some difficulty fine-tuning her insulin  pump settings to meet her needs. Robina Thacker has overall good control but is still experiencing both hyper and hypoglycemia       7/1/2020   VIRTUAL VISIT   Munira Campos a 30 y.o. female who was seen by synchronous (real-time) audio-video technology on 7/01/2020 .  The patient provided consent for this audio-video  interaction.  The Quitbite platform was used. Patient and provider were located at their individual homes.        10/12/2020   VIRTUAL VISIT   Elijah Suarez a 30 y.o. female who was seen by synchronous (real-time) audio-video technology on 10/12/2020 .  The patient provided consent for this audio-video  interaction.  The Quitbite platform was used. Patient and provider were located at their individual homes.            Current therapy: Omni pod pump with Humalog and Dexcom G6 CGM       Date of diagnosis: 11/8/2018       Diet: 45-60 grams of carbohydrates with meals, now carb counting       Exercise:  No routine           Body weight trend: lost ~20 pounds from March to November 2018; regained 10-15 pounds since diagnosis with  diabetes, 5lb weight gain summer 2020                     Wt Readings from Last 3 Encounters:        02/05/20  111 lb (50.3 kg)     01/03/20  113 lb (51.3 kg)     09/26/19  113 lb (51.3 kg)                 Pregnancy: Has no current plans for pregnancy, IUD in place possible future plans for pregnancy       Diabetes education: The patient has not received formal diabetes education (had short review at [place] [address] in early November 2018).        Diabetic complications:                     Retinopathy : Last eye exam July 2021 with no diabetic retinopathy - [facility]                   Albuminuria/nephropathy :                           11/16/2018      Cr 0.59,                            01/31/2019      Cr 0.54, , microalbumin/Cr ratio <30                              09/02/2021      Cr 0.66, , microalbumin/Cr ratio <27                              Neuropathy :  No suggestive symptoms             Insulin Pump:    Tandem Tslim X2 pump with Control IQ technology      TDD 32.93 units      Basal   15.22 units, 46%    Bolus     Food  15.14units, 46%     Correct 1.90 units, 6%    2% override    Home blood glucose monitoring frequency:   By review of CGM download over past 30 days  Average blood glucose 160 ± 52.6  Time in range 69%  High 0%, Very High 29%  Low 0%, Very Low 2%     Typical Standard Deviation   Fasting 153 39   AC lunch 167 51   AC supper 165 62   Bedtime 155 53     Blood glucose levels are stable with intermittently hyperglycemia        Hypoglycemia: frequent during the day, after meals and with activity.  She has hypoglycemic  symptomatic awareness for blood glucose less than the high 70s.       Hemoglobin A1c:   11/08/2018: 10.0%.   02/26/2019: 5.5%   05/06/2019: 5.7%   09/26/2019: 6.3%   01/03/2020: 6.1%   09/02/2021: 6.7%   02/17/2022: 6.4%       Other pertinent labs:                   Lipids :                            01/31/2019  TC- 182, LDL- 111, VLDL- 7,  HDL- 64, TG- 34                           Thyroid :                            15/44/9636: TSH 1.600                           01/31/2019: TSH 1.410                              09/02/2021      0.585                 Allergies & Medications:  Reviewed in chart. Review of Systems   HENT:        Bleeding gums   Genitourinary:        Irregular menses   Hematological: Bruises/bleeds easily. Vital Signs:  /70   Pulse (!) 104   Ht 5' 2\" (1.575 m)   Wt 122 lb 6.4 oz (55.5 kg)   SpO2 98%   BMI 22.39 kg/m²       Physical Exam  Constitutional:       Appearance: Normal appearance. HENT:      Head: Normocephalic. Neck:      Thyroid: No thyroid mass or thyromegaly. Vascular: No carotid bruit. Cardiovascular:      Rate and Rhythm: Normal rate and regular rhythm. Pulmonary:      Effort: Pulmonary effort is normal.      Breath sounds: Normal breath sounds. Abdominal:      Palpations: Abdomen is soft. Musculoskeletal:      Cervical back: Neck supple. Right lower leg: No edema. Left lower leg: No edema. Feet:      Right foot:      Protective Sensation: 3 sites tested. 3 sites sensed. Skin integrity: Skin integrity normal.      Left foot:      Protective Sensation: 3 sites tested. 3 sites sensed. Skin integrity: Skin integrity normal.   Lymphadenopathy:      Cervical: No cervical adenopathy. Skin:     General: Skin is warm and dry. Findings: Bruising present. No petechiae. Neurological:      General: No focal deficit present. Mental Status: She is alert. Sensory: Sensation is intact. Psychiatric:         Mood and Affect: Mood normal.         Behavior: Behavior normal.         Thought Content: Thought content normal.         Judgment: Judgment normal.             Return if in state, for Type 1 with pump f/u. Portions of this note were generated with the assistance of voice recogniton software. As such, some errors in transcription may be present.

## 2022-06-28 ENCOUNTER — OFFICE VISIT (OUTPATIENT)
Dept: ONCOLOGY | Age: 27
End: 2022-06-28
Payer: COMMERCIAL

## 2022-06-28 ENCOUNTER — HOSPITAL ENCOUNTER (OUTPATIENT)
Dept: LAB | Age: 27
Discharge: HOME | End: 2022-07-01
Payer: COMMERCIAL

## 2022-06-28 VITALS
TEMPERATURE: 99.6 F | HEART RATE: 117 BPM | HEIGHT: 62 IN | BODY MASS INDEX: 22.08 KG/M2 | OXYGEN SATURATION: 97 % | WEIGHT: 120 LBS | RESPIRATION RATE: 18 BRPM | DIASTOLIC BLOOD PRESSURE: 107 MMHG | SYSTOLIC BLOOD PRESSURE: 142 MMHG

## 2022-06-28 DIAGNOSIS — R23.3 EASY BRUISING: Primary | ICD-10-CM

## 2022-06-28 DIAGNOSIS — R23.3 EASY BRUISING: ICD-10-CM

## 2022-06-28 LAB
ABO + RH BLD: NORMAL
ALBUMIN SERPL-MCNC: 3.9 G/DL (ref 3.5–5)
ALBUMIN/GLOB SERPL: 1 {RATIO} (ref 1.2–3.5)
ALP SERPL-CCNC: 77 U/L (ref 50–136)
ALT SERPL-CCNC: 23 U/L (ref 12–65)
ANION GAP SERPL CALC-SCNC: 5 MMOL/L (ref 7–16)
APTT PPP: 22 SEC (ref 24.1–35.1)
AST SERPL-CCNC: 23 U/L (ref 15–37)
BASOPHILS # BLD: 0 K/UL (ref 0–0.2)
BASOPHILS NFR BLD: 0 % (ref 0–2)
BILIRUB SERPL-MCNC: 0.3 MG/DL (ref 0.2–1.1)
BUN SERPL-MCNC: 14 MG/DL (ref 6–23)
CALCIUM SERPL-MCNC: 9.1 MG/DL (ref 8.3–10.4)
CHLORIDE SERPL-SCNC: 104 MMOL/L (ref 98–107)
CO2 SERPL-SCNC: 28 MMOL/L (ref 21–32)
CREAT SERPL-MCNC: 0.8 MG/DL (ref 0.6–1)
CRP SERPL-MCNC: <0.3 MG/DL (ref 0–0.9)
DIFFERENTIAL METHOD BLD: ABNORMAL
EOSINOPHIL # BLD: 0 K/UL (ref 0–0.8)
EOSINOPHIL NFR BLD: 1 % (ref 0.5–7.8)
ERYTHROCYTE [DISTWIDTH] IN BLOOD BY AUTOMATED COUNT: 15.3 % (ref 11.9–14.6)
ERYTHROCYTE [SEDIMENTATION RATE] IN BLOOD: 9 MM/HR (ref 0–20)
FERRITIN SERPL-MCNC: 6 NG/ML (ref 8–388)
FIBRINOGEN PPP-MCNC: 259 MG/DL (ref 190–501)
FOLATE SERPL-MCNC: 9.3 NG/ML (ref 3.1–17.5)
GLOBULIN SER CALC-MCNC: 3.8 G/DL (ref 2.3–3.5)
GLUCOSE SERPL-MCNC: 127 MG/DL (ref 65–100)
HCT VFR BLD AUTO: 35.4 % (ref 35.8–46.3)
HGB BLD-MCNC: 11.6 G/DL (ref 11.7–15.4)
IMM GRANULOCYTES # BLD AUTO: 0 K/UL (ref 0–0.5)
IMM GRANULOCYTES NFR BLD AUTO: 0 % (ref 0–5)
INR PPP: 0.9
IRON SATN MFR SERPL: 13 %
IRON SERPL-MCNC: 49 UG/DL (ref 35–150)
LYMPHOCYTES # BLD: 1.1 K/UL (ref 0.5–4.6)
LYMPHOCYTES NFR BLD: 27 % (ref 13–44)
MAGNESIUM SERPL-MCNC: 2.1 MG/DL (ref 1.8–2.4)
MCH RBC QN AUTO: 28.4 PG (ref 26.1–32.9)
MCHC RBC AUTO-ENTMCNC: 32.8 G/DL (ref 31.4–35)
MCV RBC AUTO: 86.6 FL (ref 79.6–97.8)
MONOCYTES # BLD: 0.3 K/UL (ref 0.1–1.3)
MONOCYTES NFR BLD: 7 % (ref 4–12)
NEUTS SEG # BLD: 2.8 K/UL (ref 1.7–8.2)
NEUTS SEG NFR BLD: 66 % (ref 43–78)
NRBC # BLD: 0 K/UL (ref 0–0.2)
PLATELET # BLD AUTO: 217 K/UL (ref 150–450)
PMV BLD AUTO: 10.6 FL (ref 9.4–12.3)
POTASSIUM SERPL-SCNC: 3.8 MMOL/L (ref 3.5–5.1)
PROT SERPL-MCNC: 7.7 G/DL (ref 6.3–8.2)
PROTHROMBIN TIME: 12.7 SEC (ref 12.6–14.5)
RBC # BLD AUTO: 4.09 M/UL (ref 4.05–5.2)
SODIUM SERPL-SCNC: 137 MMOL/L (ref 136–145)
TIBC SERPL-MCNC: 389 UG/DL (ref 250–450)
VIT B12 SERPL-MCNC: 392 PG/ML (ref 193–986)
WBC # BLD AUTO: 4.2 K/UL (ref 4.3–11.1)

## 2022-06-28 PROCEDURE — 83540 ASSAY OF IRON: CPT

## 2022-06-28 PROCEDURE — 85730 THROMBOPLASTIN TIME PARTIAL: CPT

## 2022-06-28 PROCEDURE — 36415 COLL VENOUS BLD VENIPUNCTURE: CPT

## 2022-06-28 PROCEDURE — 85670 THROMBIN TIME PLASMA: CPT

## 2022-06-28 PROCEDURE — 85025 COMPLETE CBC W/AUTO DIFF WBC: CPT

## 2022-06-28 PROCEDURE — 85610 PROTHROMBIN TIME: CPT

## 2022-06-28 PROCEDURE — 85246 CLOT FACTOR VIII VW ANTIGEN: CPT

## 2022-06-28 PROCEDURE — 80053 COMPREHEN METABOLIC PANEL: CPT

## 2022-06-28 PROCEDURE — 82728 ASSAY OF FERRITIN: CPT

## 2022-06-28 PROCEDURE — 83735 ASSAY OF MAGNESIUM: CPT

## 2022-06-28 PROCEDURE — 86140 C-REACTIVE PROTEIN: CPT

## 2022-06-28 PROCEDURE — 82746 ASSAY OF FOLIC ACID SERUM: CPT

## 2022-06-28 PROCEDURE — 99205 OFFICE O/P NEW HI 60 MIN: CPT | Performed by: PEDIATRICS

## 2022-06-28 PROCEDURE — 85384 FIBRINOGEN ACTIVITY: CPT

## 2022-06-28 PROCEDURE — 82607 VITAMIN B-12: CPT

## 2022-06-28 PROCEDURE — 86900 BLOOD TYPING SEROLOGIC ABO: CPT

## 2022-06-28 PROCEDURE — 85652 RBC SED RATE AUTOMATED: CPT

## 2022-06-28 RX ORDER — AMOXICILLIN 500 MG/1
CAPSULE ORAL
COMMUNITY
Start: 2022-06-20

## 2022-06-28 NOTE — PATIENT INSTRUCTIONS
Patient Instructions from Today's Visit    Reason for Visit:  New Patient Visit    Plan:  -We will draw some labs today and some of them may take a while to process but we will you back in to review them    -The hypermobile joints is a lax in collagen which can also be in your blood vessels.      -It is reassuring that you have had some piercings, diabetes monitor pump changes that did not cause significant bleeding which is a good sign that this isn't a significant bleeding disorder. Follow Up: Follow up with Dr. Saleem Ma depending on your lab results        Treatment Summary has been discussed and given to patient: n/a        -------------------------------------------------------------------------------------------------------------------  Please call our office at [phone] if you have any  of the following symptoms:   · Fever of 100.5 or greater  · Chills  · Shortness of breath  · Swelling or pain in one leg    After office hours an answering service is available and will contact a provider for emergencies or if you are experiencing any of the above symptoms.  Patient did express an interest in My Chart. My Chart log in information explained on the after visit summary printout at the [facility] [address] desk.

## 2022-06-28 NOTE — PROGRESS NOTES
HISTORY OF PRESENT ILLNESS  Stef Sebastian is a 32 y.o. y.o. female with No diagnosis found. ABSTRACT  New Patient Abstract    Reason for Referral: Easy bruising    Referring Provider:  ZULY Ohara NP    Primary Care Provider: Vanessa Monroe MD    Family History of Cancer/Hematologic Disorders: Sister with autoimmune disorder; MGF with lung cancer    Presenting Symptoms: upper and lower extremity bruising    Narrative with recent with Results/Procedures/Biopsies and Dates completed:   Ms. Alex Espinoza is a 64-year-old  female who reports to have never used tobacco products or drug substances. She reports alcohol use as 1 drink per day. Her medical history reports as scrofula, ADHD, and type I diabetes. Her surgical history reports as cervical lymphadenectomy. In May 2022, Ms. Valdes presented to her PCP with complaints of small bruising to arms and legs that had been present for about 2 weeks however noticed large dark bruising last week. She denies any active bleeding, injuries or pain. Her 5/10/22 CBC reported WNL except an elevated RDW of 16.8. Her 5/2022 iron panel reported WNL except of a decreased ferritin of 9. Her 5/2022 coagulation reported a decreased PT of 9.4 and PTT of 21.2 with a normal INR of 0.90. Her repeat CBC from 6/1/22 reported WNL. Her 6/2022 iron panel reported WNL except for a decreased ferritin of 9. Her 6/2022 coagulation studies reported a decreased PT of 9.4 and PTT of 21.8 with a normal INR of 0.90. A referral was placed to hematology to further evaluate her easy bruising.       Labs            Notes from Referring Provider:  n/a    Other Pertinent Information: n/a    Presented at Tumor Board: n/a  HPI: started in May with bruising; lower extremities  A few on arm  Has dogs    Started concerta in April  12FJ, 27mg  Dad bruises, states low plts    No blood transfusion  No iron infusion  HPI-Menstruation: any defines HMB (heavy menstrual bleeding)   x >7 days  x soaking through pad/tampon <2 hours  ? soaking clothes/bedclothes  ? passing clots  ? iron deficiency  ? anemia    No Past Medical History of   Structural Issues (PALM):  Polyps (uterine), Adenomyosis, Lieomyoma, Malignancy  Non Structural Issues (COEI): Coagulopathy, Ovulatory bleeding, endometrial, iatrogenic    OCPs did not   Off iud   Off ocps    Patient Denies:   Fevers   Night Sweats   Chills   Weight Loss   Bone Pain   Lymphadenopathy  Patient Denies:  Nose bleeds  Gum bleeds  Bruising or petechia  Bleeding with surgery  Bleeding with accidents  Transfusions  History or free bleeding or hemophilia      (A)  With the palm of the hand and forearm resting on a flat surface with the elbow flexed at 90°, if the metacarpal-phalangeal joint of the fifth finger can be hyperextended more than 90° with respect to the dorsum of the hand, it is considered positive, scoring 1 point. (B)  With arms outstretched forward but hand pronated, if the thumb can be passively moved to touch the ipsilateral forearm it is considered positive scoring 1 point. (C) With the arms outstretched to the side and hand supine, if the elbow extends more than 10°, it is considered positive scoring 1 point. (D) While standing, with knees locked in genu recurvatum, if the knee extends more than 10°, it is considered positive scoring 1 point. (E)  With knees locked straight and feet together, if the patient can bend forward to place the total palm of both hands flat on the floor just in front of the feet, it is considered positive scoring 1 point.      Beighton Score = ____5___      Current Outpatient Medications   Medication Sig    amoxicillin (AMOXIL) 500 MG capsule TAKE 2 CAPSULES BY MOUTH TWICE A DAY FOR 10 DAYS    methylphenidate (CONCERTA) 27 MG extended release tablet Concerta    Continuous Blood Gluc Sensor (DEXCOM G6 SENSOR) MISC     Continuous Blood Gluc Transmit (DEXCOM G6 TRANSMITTER) MISC     sertraline (ZOLOFT) 50 MG tablet sertraline 50 mg tablet    HUMALOG 100 UNIT/ML SOLN injection vial Inject into the skin    Insulin Infusion Pump (T: SLIM X2 INS /CONTROL 7.4) MERVAT Pump settings: standard pattern- 24 hour total 14.400 units Time MN  0.7, 5am  0.55, 1030 04, 3pm 0.30 8pm 0.6 Carb Ratio MN 5.0, 1030am 4.0, 9pm 5.5 Insulin Sensitivity MN 80 1030 pm 85 Target 110 Active Insulin time 5 Max Bolus 20 units    LORazepam (ATIVAN) 0.5 MG tablet Take 0.5 mg by mouth every 8 hours as needed. (Patient not taking: Reported on 6/28/2022)     No current facility-administered medications for this visit. Past Medical History:   Diagnosis Date    Scrofula 2003    Type 1 diabetes mellitus ([facility] [address]        Past Surgical History:   Procedure Laterality Date    LYMPHADENECTOMY Left 2003    cervical       Family History   Problem Relation Age of Onset    Thyroid Disease Maternal Grandmother         goiter, Hashimoto's thyroiditis    Thyroid Disease Sister         hypothyroidism    Cancer Maternal Grandfather         lung    Diabetes Neg Hx     No Known Problems Mother     Other Sister         autoimmune disorder    No Known Problems Father        Social History     Tobacco Use    Smoking status: Never Smoker    Smokeless tobacco: Never Used   Substance Use Topics    Alcohol use: Yes    Drug use: Never       Immunization History   Administered Date(s) Administered    COVID-19, PFIZER PURPLE top, DILUTE for use, (age 15 y+), 30mcg/0.3mL 01/01/2022       No Known Allergies      Review of Systems   Review of Systems   Constitutional: Negative. HENT: Negative. Eyes: Negative. Respiratory: Negative. Cardiovascular: Negative. Gastrointestinal: Negative. Genitourinary: Negative. Musculoskeletal: Negative. Skin: Negative. Neurological: Negative. Endo/Heme/Allergies: Negative. Psychiatric/Behavioral: Negative.       Pain reviewed fully and addressed this visit  Med review and reconciliation addressed fully this visit  ADLs and performance level addressed, ECOG 0 unless otherwise addressed    Blood pressure (!) 142/107, pulse (!) 117, temperature 99.6 °F (37.6 °C), resp. rate 18, height 5' 2\" (1.575 m), weight 120 lb (54.4 kg), SpO2 97 %. Physical Exam:   Constitutional: Well developed, well nourished female in no acute distress, sitting comfortably   HEENT: Normocephalic and atraumatic. Oropharynx is clear, mucous membranes are moist.  Pupils are equal, round, and reactive to light. Extraocular muscles are intact. Sclerae anicteric. Neck supple without JVD. No thyromegaly present. Lymph node   No palpable submandibular, cervical, supraclavicular, axillary or inguinal lymph nodes. Skin Warm and dry. No bruising and no rash noted. No erythema. No pallor. Respiratory Lungs are clear to auscultation bilaterally without wheezes, rales or rhonchi, normal air exchange without accessory muscle use. CVS Normal rate, regular rhythm and normal S1 and S2. No murmurs, gallops, or rubs. Abdomen Soft, nontender and nondistended, normoactive bowel sounds. No palpable mass. No hepatosplenomegaly. Neuro Grossly nonfocal with no obvious sensory or motor deficits. MSK Normal range of motion in general.  No edema and no tenderness. PS ECOG = 0   Psych Appropriate mood and affect. Hypermobile      No visits with results within 3 Day(s) from this visit. Latest known visit with results is:   Office Visit on 06/03/2022   Component Date Value Ref Range Status    Hemoglobin A1C, POC 06/03/2022 6.7  % Final         Radiology:  CT Results (most recent):  No results found for this or any previous visit from the past 365 days. PET Results (most recent):  No results found for this or any previous visit from the past 365 days. MARINE Results (most recent):  No results found for this or any previous visit from the past 365 days.     US  No results found for this or any previous visit from the past 365 days. Patient Active Problem List   Diagnosis    Pure hypercholesterolemia    Major depressive disorder, recurrent, mild (HCC)    Controlled diabetes mellitus type 1 without complications (HCC)    Insulin pump in place    Type 1 diabetes mellitus with hyperglycemia (HCC)    Mixed hyperlipidemia         ASSESSMENT and PLAN  31 yo with a history of easy bruising with no significant bleeding issues, despite insulin pump and continuous glucose monitoring ? Lower ext thrombophlebitis.  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  1) Bruising: mild hypermobility, but bleeding screen pending  -does not sound like a bleeding diathesis  -reassurance  -avoid aspirin/aspirin products  No follow up necessary if bleeding screen negative  -elevated TT, doubt significant  -await VW studies but needs repeated  -follow up 3 months  2) LUCÍA screen  -follow up pending levels  -Mild LUCÍA, ferous sulfate 325mg ii po daily with vit c  Follow up either prn or pending iron stores results  Total time 70 min 50% in direct consultation about the patient's diagnosis and management  Lou Zheng MD  Director, Adolescent Young Adult [facility]  [id number] [place] [address], [address] [place] [address] Phone

## 2022-06-29 LAB
FACT VIII ACT/NOR PPP: 186 % (ref 56–140)
INTERPRETATION: ABNORMAL
THROMBIN TIME: 19.5 SECS (ref 15.4–17.9)
VWF AG ACT/NOR PPP IA: 183 % (ref 50–200)
VWF:RCO ACT/NOR PPP PL AGG: 163 % (ref 50–200)

## 2022-07-07 ENCOUNTER — CLINICAL DOCUMENTATION (OUTPATIENT)
Dept: CASE MANAGEMENT | Age: 27
End: 2022-07-07

## 2022-07-07 RX ORDER — FERROUS SULFATE 325(65) MG
325 TABLET ORAL 2 TIMES DAILY
Qty: 180 TABLET | Refills: 1 | Status: SHIPPED | OUTPATIENT
Start: 2022-07-07

## 2022-07-07 NOTE — PROGRESS NOTES
7/7/22 - Spoke with patient about starting ferrous sulfate BID with a Vitamin C source. Patient verbalized understanding. Patient will return to clinic in 3 months with repeat coags.

## 2022-08-25 DIAGNOSIS — E10.65 TYPE 1 DIABETES MELLITUS WITH HYPERGLYCEMIA (HCC): ICD-10-CM

## 2022-08-26 RX ORDER — INSULIN LISPRO 100 [IU]/ML
INJECTION, SOLUTION INTRAVENOUS; SUBCUTANEOUS
Qty: 50 ML | Refills: 2 | OUTPATIENT
Start: 2022-08-26

## 2022-08-28 DIAGNOSIS — E10.65 TYPE 1 DIABETES MELLITUS WITH HYPERGLYCEMIA (HCC): ICD-10-CM

## 2022-08-29 RX ORDER — INSULIN LISPRO 100 [IU]/ML
INJECTION, SOLUTION INTRAVENOUS; SUBCUTANEOUS
Qty: 20 ML | Refills: 11 | OUTPATIENT
Start: 2022-08-29

## 2022-08-31 DIAGNOSIS — E10.65 TYPE 1 DIABETES MELLITUS WITH HYPERGLYCEMIA (HCC): ICD-10-CM

## 2022-08-31 RX ORDER — INSULIN LISPRO 100 [IU]/ML
INJECTION, SOLUTION INTRAVENOUS; SUBCUTANEOUS
Qty: 20 ML | Refills: 11 | OUTPATIENT
Start: 2022-08-31

## 2022-10-05 DIAGNOSIS — R79.1 ABNORMAL COAGULATION PROFILE: ICD-10-CM

## 2022-10-05 DIAGNOSIS — E61.1 IRON DEFICIENCY: ICD-10-CM

## 2022-10-05 DIAGNOSIS — R23.3 EASY BRUISING: Primary | ICD-10-CM

## 2022-10-06 DIAGNOSIS — E10.65 TYPE 1 DIABETES MELLITUS WITH HYPERGLYCEMIA (HCC): ICD-10-CM

## 2022-10-11 RX ORDER — INSULIN LISPRO 100 [IU]/ML
INJECTION, SOLUTION INTRAVENOUS; SUBCUTANEOUS
Qty: 20 ML | Refills: 11 | OUTPATIENT
Start: 2022-10-11